# Patient Record
Sex: FEMALE | Race: WHITE | NOT HISPANIC OR LATINO | Employment: UNEMPLOYED | URBAN - METROPOLITAN AREA
[De-identification: names, ages, dates, MRNs, and addresses within clinical notes are randomized per-mention and may not be internally consistent; named-entity substitution may affect disease eponyms.]

---

## 2020-03-24 ENCOUNTER — PROCEDURE VISIT (OUTPATIENT)
Dept: OBSTETRICS AND GYNECOLOGY | Facility: CLINIC | Age: 30
End: 2020-03-24

## 2020-03-24 ENCOUNTER — INITIAL PRENATAL (OUTPATIENT)
Dept: OBSTETRICS AND GYNECOLOGY | Facility: CLINIC | Age: 30
End: 2020-03-24

## 2020-03-24 VITALS
HEIGHT: 62 IN | WEIGHT: 113 LBS | SYSTOLIC BLOOD PRESSURE: 100 MMHG | BODY MASS INDEX: 20.8 KG/M2 | DIASTOLIC BLOOD PRESSURE: 70 MMHG

## 2020-03-24 DIAGNOSIS — Z71.6 ENCOUNTER FOR SMOKING CESSATION COUNSELING: ICD-10-CM

## 2020-03-24 DIAGNOSIS — Z34.91 INITIAL OBSTETRIC VISIT IN FIRST TRIMESTER: Primary | ICD-10-CM

## 2020-03-24 DIAGNOSIS — Z34.92 UNCERTAIN DATES, ANTEPARTUM, SECOND TRIMESTER: Primary | ICD-10-CM

## 2020-03-24 LAB
GLUCOSE UR STRIP-MCNC: NEGATIVE MG/DL
PROT UR STRIP-MCNC: ABNORMAL MG/DL

## 2020-03-24 PROCEDURE — 99204 OFFICE O/P NEW MOD 45 MIN: CPT | Performed by: OBSTETRICS & GYNECOLOGY

## 2020-03-24 PROCEDURE — 76815 OB US LIMITED FETUS(S): CPT | Performed by: OBSTETRICS & GYNECOLOGY

## 2020-03-24 RX ORDER — VITAMIN A, ASCORBIC ACID, CHOLECALCIFEROL, .ALPHA.-TOCOPHEROL ACETATE, DL-, THIAMINE MONONITRATE, RIBOFLAVIN, NIACINAMIDE, PYRIDOXINE HYDROCHLORIDE, FOLIC ACID, CYANOCOBALAMIN, CALCIUM CARBONATE, IRON, ZINC OXIDE, AND CUPRIC OXIDE 4000; 120; 400; 22; 1.84; 3; 20; 10; 1; 12; 200; 29; 25; 2 [IU]/1; MG/1; [IU]/1; [IU]/1; MG/1; MG/1; MG/1; MG/1; MG/1; UG/1; MG/1; MG/1; MG/1; MG/1
1 TABLET ORAL DAILY
Qty: 30 TABLET | Refills: 11 | Status: SHIPPED | OUTPATIENT
Start: 2020-03-24 | End: 2021-12-17

## 2020-03-24 NOTE — PROGRESS NOTES
CC: Initial obstetric visit    HPI: 29-year-old  6 para 2 presents at 15-3/7weeks by her certain last menstrual period and confirmed by today's ultrasound for her initial obstetric visit.  She is feeling well.  She is not experiencing vaginal bleeding or pelvic pain.  Her course was significant for 2 previous vaginal deliveries.  She does smoke approximately 4 cigarettes daily.    Review of systems    This is negative for nausea or vomiting.  It is negative for fever or chills.  It is negative for abdominal or pelvic pain.  It is negative for vaginal bleeding.  All other systems are reviewed and are negative    Assessment and plan    1.  Intrauterine pregnancy at 15-3/7 weeks  2.  Pregnancy related counseling was undertaken and questions were answered.  3.  Prenatal vitamins prescribed  4.  Counseled regarding genetic screening.  Questions answered.  The patient would like to proceed with free fetal DNA testing, cystic fibrosis carrier screening and AFP testing.  5.  Patient smokes approximately 4 cigarettes daily.  Counseled regarding methods to achieve gradual withdrawal from cigarettes.  The patient would like to try this.  5 minutes were spent in direct face-to-face counseling on this issue.  6.  Counseled regarding precautions related to COVID 19

## 2020-03-25 LAB
C TRACH RRNA CVX QL NAA+PROBE: NEGATIVE
CONV .: NORMAL
CYTOLOGIST CVX/VAG CYTO: NORMAL
CYTOLOGY CVX/VAG DOC CYTO: NORMAL
CYTOLOGY CVX/VAG DOC THIN PREP: NORMAL
DX ICD CODE: NORMAL
HIV 1 & 2 AB SER-IMP: NORMAL
N GONORRHOEA RRNA CVX QL NAA+PROBE: NEGATIVE
OTHER STN SPEC: NORMAL
STAT OF ADQ CVX/VAG CYTO-IMP: NORMAL

## 2020-03-26 DIAGNOSIS — O09.32 INITIAL OBSTETRIC VISIT IN SECOND TRIMESTER: Primary | ICD-10-CM

## 2020-03-26 LAB
ABO GROUP BLD: ABNORMAL
BACTERIA UR CULT: NO GROWTH
BACTERIA UR CULT: NORMAL
BASOPHILS # BLD AUTO: 0 X10E3/UL (ref 0–0.2)
BASOPHILS NFR BLD AUTO: 0 %
BLD GP AB SCN SERPL QL: NEGATIVE
EOSINOPHIL # BLD AUTO: 0 X10E3/UL (ref 0–0.4)
EOSINOPHIL NFR BLD AUTO: 0 %
ERYTHROCYTE [DISTWIDTH] IN BLOOD BY AUTOMATED COUNT: 13.1 % (ref 11.7–15.4)
HBV SURFACE AG SERPL QL IA: NEGATIVE
HCT VFR BLD AUTO: 33.5 % (ref 34–46.6)
HCV AB S/CO SERPL IA: >11 S/CO RATIO (ref 0–0.9)
HGB BLD-MCNC: 11.2 G/DL (ref 11.1–15.9)
HIV 1+2 AB+HIV1 P24 AG SERPL QL IA: NON REACTIVE
IMM GRANULOCYTES # BLD AUTO: 0.1 X10E3/UL (ref 0–0.1)
IMM GRANULOCYTES NFR BLD AUTO: 1 %
LYMPHOCYTES # BLD AUTO: 1.7 X10E3/UL (ref 0.7–3.1)
LYMPHOCYTES NFR BLD AUTO: 17 %
MCH RBC QN AUTO: 30.4 PG (ref 26.6–33)
MCHC RBC AUTO-ENTMCNC: 33.4 G/DL (ref 31.5–35.7)
MCV RBC AUTO: 91 FL (ref 79–97)
MONOCYTES # BLD AUTO: 0.5 X10E3/UL (ref 0.1–0.9)
MONOCYTES NFR BLD AUTO: 5 %
NEUTROPHILS # BLD AUTO: 7.7 X10E3/UL (ref 1.4–7)
NEUTROPHILS NFR BLD AUTO: 77 %
PLATELET # BLD AUTO: 281 X10E3/UL (ref 150–450)
RBC # BLD AUTO: 3.69 X10E6/UL (ref 3.77–5.28)
RH BLD: POSITIVE
RPR SER QL: NON REACTIVE
RUBV IGG SERPL IA-ACNC: 2.08 INDEX
WBC # BLD AUTO: 10 X10E3/UL (ref 3.4–10.8)

## 2020-03-27 ENCOUNTER — RESULTS ENCOUNTER (OUTPATIENT)
Dept: OBSTETRICS AND GYNECOLOGY | Facility: CLINIC | Age: 30
End: 2020-03-27

## 2020-03-27 DIAGNOSIS — O09.32 INITIAL OBSTETRIC VISIT IN SECOND TRIMESTER: ICD-10-CM

## 2020-03-27 LAB
AFP ADJ MOM SERPL: 1
AFP INTERP SERPL-IMP: NORMAL
AFP INTERP SERPL-IMP: NORMAL
AFP SERPL-MCNC: 37 NG/ML
AGE AT DELIVERY: 32.2 YR
GA METHOD: NORMAL
GA: 15.4 WEEKS
IDDM PATIENT QL: NO
LABORATORY COMMENT REPORT: NORMAL
MULTIPLE PREGNANCY: NO
NEURAL TUBE DEFECT RISK FETUS: NORMAL %
RESULT: NORMAL

## 2020-03-28 LAB
CFDNA.FET/CFDNA.TOTAL SFR FETUS: NORMAL %
CFDNA.FET/CFDNA.TOTAL SFR FETUS: NORMAL %
CITATION REF LAB TEST: NORMAL
FET 13+18+21+X+Y ANEUP PLAS.CFDNA: NEGATIVE
FET CHR 21 TS PLAS.CFDNA QL: NEGATIVE
FET SEX PLAS.CFDNA DOSAGE CFDNA: NORMAL
FET TS 13 RISK PLAS.CFDNA QL: NEGATIVE
FET TS 18 RISK WBC.DNA+CFDNA QL: NEGATIVE
GA EST FROM CONCEPTION DATE: NORMAL D
GESTATIONAL AGE > 9:: NORMAL
LAB DIRECTOR NAME PROVIDER: NORMAL
LABORATORY COMMENT REPORT: NORMAL
LIMITATIONS OF THE TEST: NORMAL
NEGATIVE PREDICTIVE VALUE: NORMAL
NOTE: NORMAL
PERFORMANCE CHARACTERISTICS: NORMAL
POSITIVE PREDICTIVE VALUE: NORMAL
REF LAB TEST METHOD: NORMAL
TEST PERFORMANCE INFO SPEC: NORMAL

## 2020-03-30 LAB
CFTR MUT ANL BLD/T: NORMAL
LABORATORY COMMENT REPORT: NORMAL

## 2020-04-09 ENCOUNTER — TELEPHONE (OUTPATIENT)
Dept: GASTROENTEROLOGY | Facility: CLINIC | Age: 30
End: 2020-04-09

## 2020-04-09 NOTE — TELEPHONE ENCOUNTER
Lm on pt vm office visit changed to [facility] TELEHEALTH visit due to covd restrictions pt to call to confirm [facility] not [facility] up

## 2020-04-21 ENCOUNTER — PROCEDURE VISIT (OUTPATIENT)
Dept: OBSTETRICS AND GYNECOLOGY | Facility: CLINIC | Age: 30
End: 2020-04-21

## 2020-04-21 ENCOUNTER — ROUTINE PRENATAL (OUTPATIENT)
Dept: OBSTETRICS AND GYNECOLOGY | Facility: CLINIC | Age: 30
End: 2020-04-21

## 2020-04-21 VITALS — WEIGHT: 120.8 LBS | SYSTOLIC BLOOD PRESSURE: 108 MMHG | BODY MASS INDEX: 22.09 KG/M2 | DIASTOLIC BLOOD PRESSURE: 70 MMHG

## 2020-04-21 DIAGNOSIS — Z36.89 ENCOUNTER FOR FETAL ANATOMIC SURVEY: Primary | ICD-10-CM

## 2020-04-21 DIAGNOSIS — O44.42 LOW-LYING PLACENTA IN SECOND TRIMESTER: ICD-10-CM

## 2020-04-21 DIAGNOSIS — O44.40 LOW-LYING PLACENTA: ICD-10-CM

## 2020-04-21 DIAGNOSIS — Z34.92 SECOND TRIMESTER PREGNANCY: Primary | ICD-10-CM

## 2020-04-21 LAB
GLUCOSE UR STRIP-MCNC: NEGATIVE MG/DL
PROT UR STRIP-MCNC: NEGATIVE MG/DL

## 2020-04-21 PROCEDURE — 76805 OB US >/= 14 WKS SNGL FETUS: CPT | Performed by: OBSTETRICS & GYNECOLOGY

## 2020-04-21 PROCEDURE — 99214 OFFICE O/P EST MOD 30 MIN: CPT | Performed by: OBSTETRICS & GYNECOLOGY
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
## 2020-04-23 LAB — HCV RNA SERPL QL NAA+PROBE: POSITIVE

## 2020-04-30 ENCOUNTER — TELEMEDICINE (OUTPATIENT)
Dept: GASTROENTEROLOGY | Facility: CLINIC | Age: 30
End: 2020-04-30

## 2020-04-30 VITALS — BODY MASS INDEX: 22.09 KG/M2 | HEIGHT: 62 IN

## 2020-04-30 DIAGNOSIS — Z34.92 PREGNANT AND NOT YET DELIVERED IN SECOND TRIMESTER: ICD-10-CM

## 2020-04-30 DIAGNOSIS — B18.2 CHRONIC HEPATITIS C WITHOUT HEPATIC COMA (HCC): Primary | ICD-10-CM

## 2020-04-30 DIAGNOSIS — F19.11 HISTORY OF DRUG ABUSE IN REMISSION (HCC): ICD-10-CM

## 2020-04-30 PROCEDURE — 99204 OFFICE O/P NEW MOD 45 MIN: CPT | Performed by: INTERNAL MEDICINE

## 2020-04-30 NOTE — PROGRESS NOTES
Chief Complaint   Patient presents with   • Hepatitis C       Subjective     HPI    Demi Ag is a 29 y.o. female with a past medical history noted below who presents for evaluation of hepatitis C.  She is currently pregnant and she was found to have a positive Hepatis C antibody.  She is followed by Dr Boateng     This was the first she heard of being hepatitis C positive.  She denies any prior history of liver disease.  No right upper quadrant pain.  No jaundice, no icterus.  Her hepatitis B antibody was negative, her HIV screening was negative.    She has not had a CMP done.  She has not had a liver ultrasound.  March 24 labs show a normal hemoglobin and normal platelets.    She resides and teaches classes at [employer]    She is currently a smoker.  No current ETOH use.    Younger brother with hepatitis C. No other family hx of liver dz.  No family hx of GI cancers.      Prior hx of homemade tattoos, piercings.  She has used drugs in the past, including IV and intranasal.  There is no current illicit drug use.    Due date is September 12.  She is set to see Dr. Rebolledo on the 19th.  She does have a low-lying placenta so is considered high risk with her pregnancy.    This was an audio and video enabled telemedicine encounter.          Past Medical History:   Diagnosis Date   • Bronchitis    • Infectious viral hepatitis C         Current Outpatient Medications:   •  Prenatal Vit-Iron Carbonyl-FA (PRENATAL PLUS IRON) 29-1 MG tablet, Take 1 tablet by mouth Daily., Disp: 30 tablet, Rfl: 11    No Known Allergies    Social History     Socioeconomic History   • Marital status: Single     Spouse name: Not on file   • Number of children: Not on file   • Years of education: Not on file   • Highest education level: Not on file   Tobacco Use   • Smoking status: Current Every Day Smoker   Substance and Sexual Activity   • Alcohol use: Not Currently   • Drug use: Not Currently   • Sexual activity: Yes      Partners: Male       Family History   Problem Relation Age of Onset   • Breast cancer Maternal Grandmother        Review of Systems   Constitutional: Negative for activity change, appetite change and fatigue.   HENT: Negative for sore throat and trouble swallowing.    Respiratory: Negative.    Cardiovascular: Negative.    Gastrointestinal: Negative for abdominal distention, abdominal pain and blood in stool.   Endocrine: Negative for cold intolerance and heat intolerance.   Genitourinary: Negative for difficulty urinating, dysuria and frequency.   Musculoskeletal: Negative for arthralgias, back pain and myalgias.   Skin: Negative.    Hematological: Negative for adenopathy. Does not bruise/bleed easily.   All other systems reviewed and are negative.      Objective     There were no vitals filed for this visit.  There were no vitals filed for this visit.  Body mass index is 22.09 kg/m².    Physical Exam   Constitutional: She is oriented to person, place, and time. She appears well-developed and well-nourished. No distress.   HENT:   Head: Normocephalic and atraumatic.   Right Ear: External ear normal.   Left Ear: External ear normal.   Nose: Nose normal.   Eyes: EOM are normal.   Pulmonary/Chest: Effort normal. No respiratory distress.   Neurological: She is alert and oriented to person, place, and time. Coordination normal.   Psychiatric: She has a normal mood and affect. Her behavior is normal. Judgment and thought content normal.       WBC   Date Value Ref Range Status   03/24/2020 10.0 3.4 - 10.8 x10E3/uL Final     RBC   Date Value Ref Range Status   03/24/2020 3.69 (L) 3.77 - 5.28 x10E6/uL Final     Hemoglobin   Date Value Ref Range Status   03/24/2020 11.2 11.1 - 15.9 g/dL Final     Hematocrit   Date Value Ref Range Status   03/24/2020 33.5 (L) 34.0 - 46.6 % Final     MCV   Date Value Ref Range Status   03/24/2020 91 79 - 97 fL Final     MCH   Date Value Ref Range Status   03/24/2020 30.4 26.6 - 33.0 pg Final      MCHC   Date Value Ref Range Status   03/24/2020 33.4 31.5 - 35.7 g/dL Final     RDW   Date Value Ref Range Status   03/24/2020 13.1 11.7 - 15.4 % Final     Platelets   Date Value Ref Range Status   03/24/2020 281 150 - 450 x10E3/uL Final     Neutrophil Rel %   Date Value Ref Range Status   03/24/2020 77 Not Estab. % Final     Lymphocyte Rel %   Date Value Ref Range Status   03/24/2020 17 Not Estab. % Final     Monocyte Rel %   Date Value Ref Range Status   03/24/2020 5 Not Estab. % Final     Eosinophil Rel %   Date Value Ref Range Status   03/24/2020 0 Not Estab. % Final     Basophil Rel %   Date Value Ref Range Status   03/24/2020 0 Not Estab. % Final     Neutrophils Absolute   Date Value Ref Range Status   03/24/2020 7.7 (H) 1.4 - 7.0 x10E3/uL Final     Lymphocytes Absolute   Date Value Ref Range Status   03/24/2020 1.7 0.7 - 3.1 x10E3/uL Final     Monocytes Absolute   Date Value Ref Range Status   03/24/2020 0.5 0.1 - 0.9 x10E3/uL Final     Eosinophils Absolute   Date Value Ref Range Status   03/24/2020 0.0 0.0 - 0.4 x10E3/uL Final     Basophils Absolute   Date Value Ref Range Status   03/24/2020 0.0 0.0 - 0.2 x10E3/uL Final       No results found for: GLUCOSE, NA, K, CO2, CL, ANIONGAP, CREATININE, BUN, BCR, CALCIUM, EGFRIFNONA, ALKPHOS, PROTEINTOT, ALT, AST, BILITOT, ALBUMIN, GLOB, LABIL2      Imaging Results (Last 7 Days)     ** No results found for the last 168 hours. **            No notes on file    Assessment/Plan    Hepatitis C: Suspected chronic, history of drug use in the past, none currently.  Lives at [facility].    5 months pregnant: With low-lying placenta, due date in September    History of drug abuse: No current drug use    Plan  Will check hepatitis C viral load, genotype  CMP to assess liver  Complete hep B testing  We discussed plans for treatment and need for delay after delivery and upon cessation of breast-feeding if she goes that route  Further recommendations after  testing  Will see if she can get labs when she sees Dr. Boateng May 19  We will plan to have her to the office in approximately October after she delivers  May need to consider liver ultrasound based on CMP results  Discussed treatment, duration, side effects, need for adherence during the treatment period, need for lab testing    23 minutes spent in discussion of plan as detailed above as well as discussing treatment options for hepatitis C along with the duration of treatment and need for lab testing during that time.    Demi was seen today for hepatitis c.    Diagnoses and all orders for this visit:    Chronic hepatitis C without hepatic coma (CMS/HCC)  -     Comprehensive Metabolic Panel; Future  -     Hepatitis C RNA, Quantitative, PCR (graph); Future  -     Hepatitis C Genotype; Future  -     Hepatitis B Surface Antibody; Future  -     HCV FibroSURE; Future  -     Hepatitis B Core Antibody, Total; Future    Pregnant and not yet delivered in second trimester  -     Comprehensive Metabolic Panel; Future  -     Hepatitis C RNA, Quantitative, PCR (graph); Future  -     Hepatitis C Genotype; Future  -     Hepatitis B Surface Antibody; Future  -     HCV FibroSURE; Future    History of drug abuse in remission (CMS/HCC)        I have discussed the above plan with the patient.  They verbalize understanding and are in agreement with the plan.  They have been advised to contact the office for any questions, concerns, or changes related to their health.    Dictated utilizing Dragon dictation

## 2020-05-05 ENCOUNTER — RESULTS ENCOUNTER (OUTPATIENT)
Dept: GASTROENTEROLOGY | Facility: CLINIC | Age: 30
End: 2020-05-05

## 2020-05-05 DIAGNOSIS — B18.2 CHRONIC HEPATITIS C WITHOUT HEPATIC COMA (HCC): ICD-10-CM

## 2020-05-07 ENCOUNTER — TELEPHONE (OUTPATIENT)
Dept: GASTROENTEROLOGY | Facility: CLINIC | Age: 30
End: 2020-05-07

## 2020-05-07 DIAGNOSIS — B18.2 CHRONIC HEPATITIS C WITHOUT HEPATIC COMA (HCC): ICD-10-CM

## 2020-05-07 DIAGNOSIS — Z34.92 PREGNANT AND NOT YET DELIVERED IN SECOND TRIMESTER: ICD-10-CM

## 2020-05-07 NOTE — TELEPHONE ENCOUNTER
"Alessia, Suzanna Evans RN Hi Melissa,      I checked with our [facility] lady and she said she would be happy to draw those labs here. However, the order has to be \"Active\" --not a future order. If you will make that change there should be no problem.     Thank you,      **Orders changed to \"active\".  Labs obtained on 5/19 should include all marked \"active\" today, and the lab of 5/2, which is \"active.  (total of  6 labs).  Message to Georgiana Barillas.  Suzanna Palacios RN.   "

## 2020-05-07 NOTE — TELEPHONE ENCOUNTER
"Overdue alert received for hep b core antibody, hcv fibrosure, hep b surface antigen, hep c genotype, hep c rna, cmp.      See note of 4/30 - \"will see if can get labs when  sees Dr Boateng 5/19.\".     Call to Dr Boateng's office @ [phone] and spoke with Phoebe.  Will check if their lab has appropriate tubes and call back.    "

## 2020-05-19 ENCOUNTER — ROUTINE PRENATAL (OUTPATIENT)
Dept: OBSTETRICS AND GYNECOLOGY | Facility: CLINIC | Age: 30
End: 2020-05-19

## 2020-05-19 ENCOUNTER — PROCEDURE VISIT (OUTPATIENT)
Dept: OBSTETRICS AND GYNECOLOGY | Facility: CLINIC | Age: 30
End: 2020-05-19

## 2020-05-19 VITALS — BODY MASS INDEX: 23.08 KG/M2 | SYSTOLIC BLOOD PRESSURE: 100 MMHG | DIASTOLIC BLOOD PRESSURE: 62 MMHG | WEIGHT: 126.2 LBS

## 2020-05-19 DIAGNOSIS — Z36.89 ENCOUNTER FOR ULTRASOUND TO CHECK FETAL GROWTH: ICD-10-CM

## 2020-05-19 DIAGNOSIS — O44.40 ULTRASOUND SCAN TO RECHECK LOW LYING PLACENTA, ANTEPARTUM: ICD-10-CM

## 2020-05-19 DIAGNOSIS — O44.40 LOW-LYING PLACENTA: ICD-10-CM

## 2020-05-19 DIAGNOSIS — Z3A.23 23 WEEKS GESTATION OF PREGNANCY: Primary | ICD-10-CM

## 2020-05-19 DIAGNOSIS — O44.40 LOW-LYING PLACENTA: Primary | ICD-10-CM

## 2020-05-19 LAB
GLUCOSE UR STRIP-MCNC: NEGATIVE MG/DL
PROT UR STRIP-MCNC: NEGATIVE MG/DL

## 2020-05-19 PROCEDURE — 76816 OB US FOLLOW-UP PER FETUS: CPT | Performed by: OBSTETRICS & GYNECOLOGY

## 2020-05-19 PROCEDURE — 99214 OFFICE O/P EST MOD 30 MIN: CPT | Performed by: OBSTETRICS & GYNECOLOGY

## 2020-05-19 NOTE — PROGRESS NOTES
CC: Obstetric visit    HPI: 29-year-old  6 para 2 presents at 23-3/7 weeks for her scheduled obstetric visit.  Her baby is moving actively.  She is feeling well.  She is doing her 1 hour glucose tolerance test today.  The patient reports that she visited with ROWAN Akhtar, regarding hepatitis C.  The notes from this visit are not available for my review.  The patient reports that additional testing was ordered, but no additional treatment will be required during pregnancy.  Also, she has a history of a low-lying placenta.  Ultrasound was repeated today.    Review of systems    This is positive for occasional low back pain.  It is negative for nausea or vomiting.  Negative for fever or chills.  Negative for abdominal or pelvic pain.  Negative for vaginal bleeding.  All other systems are reviewed and are negative.    Physical examination    The abdomen is soft and gravid.  There is no epigastric tenderness.  No fundal tenderness.  No CVA tenderness.  No suprapubic tenderness.  Extremities are equal in size with minimal pedal edema    Assessment and plan    1.  Intrauterine pregnancy at 23-3/7 weeks  2.  Hepatitis C.  The patient reports a GI evaluation.  Further testing was recommended, but no interventions during pregnancy.  We will attempt to obtain the GI notes.  3.  Ultrasound was reviewed and discussed with the patient.  The placenta remains low-lying.  I recommend continued pelvic rest for now.  We can repeat the ultrasound and approximately 4 to 5 weeks.  The patient agrees with this recommendation.

## 2020-05-20 LAB
ALBUMIN SERPL-MCNC: 3.9 G/DL (ref 3.5–5.2)
ALBUMIN/GLOB SERPL: 1.6 G/DL
ALP SERPL-CCNC: 48 U/L (ref 39–117)
ALT SERPL-CCNC: 13 U/L (ref 1–33)
AST SERPL-CCNC: 11 U/L (ref 1–32)
BASOPHILS # BLD AUTO: 0.04 10*3/MM3 (ref 0–0.2)
BASOPHILS NFR BLD AUTO: 0.4 % (ref 0–1.5)
BILIRUB SERPL-MCNC: 0.3 MG/DL (ref 0.2–1.2)
BLD GP AB SCN SERPL QL: NEGATIVE
BUN SERPL-MCNC: 8 MG/DL (ref 6–20)
BUN/CREAT SERPL: 15.7 (ref 7–25)
CALCIUM SERPL-MCNC: 9 MG/DL (ref 8.6–10.5)
CHLORIDE SERPL-SCNC: 100 MMOL/L (ref 98–107)
CO2 SERPL-SCNC: 22.8 MMOL/L (ref 22–29)
CREAT SERPL-MCNC: 0.51 MG/DL (ref 0.57–1)
EOSINOPHIL # BLD AUTO: 0.01 10*3/MM3 (ref 0–0.4)
EOSINOPHIL NFR BLD AUTO: 0.1 % (ref 0.3–6.2)
ERYTHROCYTE [DISTWIDTH] IN BLOOD BY AUTOMATED COUNT: 12.5 % (ref 12.3–15.4)
GLOBULIN SER CALC-MCNC: 2.4 GM/DL
GLUCOSE 1H P 50 G GLC PO SERPL-MCNC: 113 MG/DL (ref 65–179)
GLUCOSE SERPL-MCNC: 110 MG/DL (ref 65–99)
HBV CORE AB SERPL QL IA: NEGATIVE
HBV SURFACE AB SER QL: REACTIVE
HCT VFR BLD AUTO: 27.5 % (ref 34–46.6)
HGB BLD-MCNC: 9.5 G/DL (ref 12–15.9)
IMM GRANULOCYTES # BLD AUTO: 0.07 10*3/MM3 (ref 0–0.05)
IMM GRANULOCYTES NFR BLD AUTO: 0.7 % (ref 0–0.5)
LYMPHOCYTES # BLD AUTO: 1.53 10*3/MM3 (ref 0.7–3.1)
LYMPHOCYTES NFR BLD AUTO: 14.8 % (ref 19.6–45.3)
MCH RBC QN AUTO: 31.6 PG (ref 26.6–33)
MCHC RBC AUTO-ENTMCNC: 34.5 G/DL (ref 31.5–35.7)
MCV RBC AUTO: 91.4 FL (ref 79–97)
MONOCYTES # BLD AUTO: 0.36 10*3/MM3 (ref 0.1–0.9)
MONOCYTES NFR BLD AUTO: 3.5 % (ref 5–12)
NEUTROPHILS # BLD AUTO: 8.3 10*3/MM3 (ref 1.7–7)
NEUTROPHILS NFR BLD AUTO: 80.5 % (ref 42.7–76)
NRBC BLD AUTO-RTO: 0 /100 WBC (ref 0–0.2)
PLATELET # BLD AUTO: 269 10*3/MM3 (ref 140–450)
POTASSIUM SERPL-SCNC: 3.7 MMOL/L (ref 3.5–5.2)
PROT SERPL-MCNC: 6.3 G/DL (ref 6–8.5)
RBC # BLD AUTO: 3.01 10*6/MM3 (ref 3.77–5.28)
SODIUM SERPL-SCNC: 135 MMOL/L (ref 136–145)
WBC # BLD AUTO: 10.31 10*3/MM3 (ref 3.4–10.8)

## 2020-05-20 RX ORDER — FERROUS SULFATE 325(65) MG
325 TABLET ORAL 2 TIMES DAILY WITH MEALS
Qty: 60 TABLET | Refills: 6 | Status: SHIPPED | OUTPATIENT
Start: 2020-05-20 | End: 2021-12-17

## 2020-05-21 ENCOUNTER — RESULTS ENCOUNTER (OUTPATIENT)
Dept: OBSTETRICS AND GYNECOLOGY | Facility: CLINIC | Age: 30
End: 2020-05-21

## 2020-05-21 DIAGNOSIS — Z34.92 SECOND TRIMESTER PREGNANCY: ICD-10-CM

## 2020-05-21 LAB
A2 MACROGLOB SERPL-MCNC: 252 MG/DL (ref 110–276)
ALT SERPL W P-5'-P-CCNC: 14 IU/L (ref 0–40)
APO A-I SERPL-MCNC: 140 MG/DL (ref 116–209)
BILIRUB SERPL-MCNC: 0.2 MG/DL (ref 0–1.2)
FIBROSIS SCORING:: NORMAL
FIBROSIS STAGE SERPL QL: NORMAL
GGT SERPL-CCNC: 6 IU/L (ref 0–60)
HAPTOGLOB SERPL-MCNC: 64 MG/DL (ref 33–278)
HCV AB SER QL: NORMAL
LABORATORY COMMENT REPORT: NORMAL
LIVER FIBR SCORE SERPL CALC.FIBROSURE: 0.07 (ref 0–0.21)
NECROINFLAMM ACTIVITY SCORING:: NORMAL
NECROINFLAMMATORY ACT GRADE SERPL QL: NORMAL
NECROINFLAMMATORY ACT SCORE SERPL: 0.03 (ref 0–0.17)
SERVICE CMNT-IMP: NORMAL

## 2020-05-28 LAB
HCV GENTYP SERPL NAA+PROBE: NORMAL
HCV RNA SERPL NAA+PROBE-ACNC: NORMAL IU/ML
HCV RNA SERPL NAA+PROBE-LOG IU: 5.41 LOG10 IU/ML
LABORATORY COMMENT REPORT: NORMAL
TEST INFORMATION: NORMAL

## 2020-06-02 ENCOUNTER — TELEPHONE (OUTPATIENT)
Dept: GASTROENTEROLOGY | Facility: CLINIC | Age: 30
End: 2020-06-02

## 2020-06-03 ENCOUNTER — DOCUMENTATION (OUTPATIENT)
Dept: FAMILY MEDICINE CLINIC | Facility: TELEHEALTH | Age: 30
End: 2020-06-03

## 2020-06-03 ENCOUNTER — TELEPHONE (OUTPATIENT)
Dept: OBSTETRICS AND GYNECOLOGY | Facility: CLINIC | Age: 30
End: 2020-06-03

## 2020-06-03 NOTE — PROGRESS NOTES
[place] pain worsening each day--feels like needles and pins, lots of pressure on legs and knees; no open areas, blisters, or draining; unable to sleep because of severity of pain;     Spoke with her via phone, advised to go to nearest emergency department for evaluation and treatment; she did verbalize understanding and agreed with this plan of care    JERRY Watts  13:49  06/03/20
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
## 2020-06-16 ENCOUNTER — ROUTINE PRENATAL (OUTPATIENT)
Dept: OBSTETRICS AND GYNECOLOGY | Facility: CLINIC | Age: 30
End: 2020-06-16

## 2020-06-16 VITALS — SYSTOLIC BLOOD PRESSURE: 116 MMHG | WEIGHT: 129.4 LBS | DIASTOLIC BLOOD PRESSURE: 62 MMHG | BODY MASS INDEX: 23.67 KG/M2

## 2020-06-16 DIAGNOSIS — Z3A.27 27 WEEKS GESTATION OF PREGNANCY: Primary | ICD-10-CM

## 2020-06-16 PROCEDURE — 99213 OFFICE O/P EST LOW 20 MIN: CPT | Performed by: OBSTETRICS & GYNECOLOGY

## 2020-06-16 NOTE — PROGRESS NOTES
CC: Obstetric visit    HPI: 29-year-old  6 para 2 presents at 27-3/7 weeks for her scheduled obstetric visit.  Her baby is moving actively.  She does not have any vaginal bleeding or severe abdominal or pelvic pain.    Review of systems    This is negative for vaginal bleeding.  It is negative for severe abdominal or pelvic pain.  It is negative for nausea or vomiting.  It is negative for fever or chills.    Physical examination    The abdomen is soft and gravid.  There is no epigastric tenderness.  No fundal tenderness.  No suprapubic tenderness.  Extremities are equal in size with minimal pedal edema    Assessment and plan    1.  Intrauterine pregnancy at 27-3/7 weeks  2.  The patient has passed her 1 hour glucose tolerance test  3.  Low-lying placenta.  Counseled and questions answered.  I recommend an ultrasound soon to assess for resolution of the patient's low-lying placenta.  The patient would like to do this today if possible.

## 2020-06-18 ENCOUNTER — PROCEDURE VISIT (OUTPATIENT)
Dept: OBSTETRICS AND GYNECOLOGY | Facility: CLINIC | Age: 30
End: 2020-06-18

## 2020-06-18 DIAGNOSIS — O44.40 ULTRASOUND SCAN TO RECHECK LOW LYING PLACENTA, ANTEPARTUM: ICD-10-CM

## 2020-06-18 DIAGNOSIS — Z36.89 ENCOUNTER FOR ULTRASOUND TO CHECK FETAL GROWTH: Primary | ICD-10-CM

## 2020-06-18 PROCEDURE — 76816 OB US FOLLOW-UP PER FETUS: CPT | Performed by: OBSTETRICS & GYNECOLOGY

## 2020-06-26 ENCOUNTER — DOCUMENTATION (OUTPATIENT)
Dept: OBSTETRICS AND GYNECOLOGY | Facility: CLINIC | Age: 30
End: 2020-06-26

## 2020-07-07 ENCOUNTER — ROUTINE PRENATAL (OUTPATIENT)
Dept: OBSTETRICS AND GYNECOLOGY | Facility: CLINIC | Age: 30
End: 2020-07-07

## 2020-07-07 VITALS — WEIGHT: 125.6 LBS | BODY MASS INDEX: 22.97 KG/M2 | DIASTOLIC BLOOD PRESSURE: 62 MMHG | SYSTOLIC BLOOD PRESSURE: 108 MMHG

## 2020-07-07 DIAGNOSIS — Z3A.30 30 WEEKS GESTATION OF PREGNANCY: Primary | ICD-10-CM

## 2020-07-07 LAB
GLUCOSE UR STRIP-MCNC: NEGATIVE MG/DL
PROT UR STRIP-MCNC: NEGATIVE MG/DL

## 2020-07-07 PROCEDURE — 99212 OFFICE O/P EST SF 10 MIN: CPT | Performed by: OBSTETRICS & GYNECOLOGY

## 2020-07-07 NOTE — PROGRESS NOTES
CC: Obstetric visit    HPI: 29-year-old  6 para 2 presents at 30-3/7 weeks for her obstetric visit.  She is feeling well.  Her baby is moving actively.  She does not have vaginal bleeding or pelvic pain.  Her ultrasound last month was reviewed and discussed with her.  The placenta is now 3.4 cm from the cervical os.  The patient has resumed sexual activity without problems.    Review of systems    This is negative for abdominal or pelvic pain.  It is negative for nausea or vomiting.  It is negative for vaginal bleeding.  All other systems are reviewed and are negative.    Physical examination    The abdomen is soft and gravid.  There is no fundal tenderness.  No CVA tenderness.  No suprapubic tenderness.  Extremities are equal in size with minimal pedal edema    Assessment and plan    1.  Intrauterine pregnancy at 30-3/7 weeks  2.  History of low-lying placenta.  The placenta is now 3.4 cm from the cervical os.

## 2020-07-21 ENCOUNTER — ROUTINE PRENATAL (OUTPATIENT)
Dept: OBSTETRICS AND GYNECOLOGY | Facility: CLINIC | Age: 30
End: 2020-07-21

## 2020-07-21 VITALS — SYSTOLIC BLOOD PRESSURE: 104 MMHG | WEIGHT: 131.6 LBS | DIASTOLIC BLOOD PRESSURE: 60 MMHG | BODY MASS INDEX: 24.07 KG/M2

## 2020-07-21 DIAGNOSIS — Z3A.32 32 WEEKS GESTATION OF PREGNANCY: Primary | ICD-10-CM

## 2020-07-21 LAB
GLUCOSE UR STRIP-MCNC: NEGATIVE MG/DL
PROT UR STRIP-MCNC: NEGATIVE MG/DL

## 2020-07-21 PROCEDURE — 99212 OFFICE O/P EST SF 10 MIN: CPT | Performed by: OBSTETRICS & GYNECOLOGY

## 2020-07-21 NOTE — PROGRESS NOTES
CC: Obstetric visit    HPI: 29-year-old  6 para 2 presents at 32-3/7 weeks for scheduled obstetric visit.  Her baby is moving actively.  She has no complaints today.    Review of systems    This is negative for abdominal or pelvic pain.  Negative for vaginal bleeding.  Negative for fever or chills.  Negative for nausea or vomiting.    Physical examination    The abdomen is soft and gravid.  There is no fundal tenderness to palpation.  There is no epigastric tenderness.  There is no suprapubic tenderness.  Extremities are equal in size with minimal pedal edema    Assessment and plan    1.  Intrauterine pregnancy at 32-3/7 weeks  2.  The patient reports no more nosebleeds.  She has been using Ocean Spray nasal spray.

## 2020-08-04 ENCOUNTER — ROUTINE PRENATAL (OUTPATIENT)
Dept: OBSTETRICS AND GYNECOLOGY | Facility: CLINIC | Age: 30
End: 2020-08-04

## 2020-08-04 VITALS — BODY MASS INDEX: 24.14 KG/M2 | WEIGHT: 132 LBS | DIASTOLIC BLOOD PRESSURE: 62 MMHG | SYSTOLIC BLOOD PRESSURE: 116 MMHG

## 2020-08-04 DIAGNOSIS — Z3A.34 34 WEEKS GESTATION OF PREGNANCY: Primary | ICD-10-CM

## 2020-08-04 PROCEDURE — 99213 OFFICE O/P EST LOW 20 MIN: CPT | Performed by: OBSTETRICS & GYNECOLOGY

## 2020-08-04 NOTE — PROGRESS NOTES
CC: Obstetric visit    HPI: 29-year-old  6 para 2 presents at 34-3/7 weeks for her scheduled obstetric visit.  Her baby is moving actively.  She has pelvic pressure, but no contractions.    Review of systems    This is negative for abdominal or pelvic pain.  It is positive for pelvic pressure and low back pain.  It is negative for contractions.  Negative for vaginal bleeding.  All other systems are reviewed and are negative    Physical examination    The abdomen is soft and gravid.  There is no epigastric tenderness.  No fundal tenderness.  No CVA tenderness.  No suprapubic tenderness.  Extremities are equal in size with minimal bipedal edema    Assessment and plan    1.  Intrauterine pregnancy at 34-3/7 weeks  2.  Group B strep cultures at the next visit.  Counseled.  3.  Ultrasound at the next visit regarding fundal height greater than dates.  Counseled and questions answered.

## 2020-08-18 ENCOUNTER — ROUTINE PRENATAL (OUTPATIENT)
Dept: OBSTETRICS AND GYNECOLOGY | Facility: CLINIC | Age: 30
End: 2020-08-18

## 2020-08-18 ENCOUNTER — PROCEDURE VISIT (OUTPATIENT)
Dept: OBSTETRICS AND GYNECOLOGY | Facility: CLINIC | Age: 30
End: 2020-08-18

## 2020-08-18 VITALS — BODY MASS INDEX: 25.28 KG/M2 | DIASTOLIC BLOOD PRESSURE: 64 MMHG | WEIGHT: 138.2 LBS | SYSTOLIC BLOOD PRESSURE: 114 MMHG

## 2020-08-18 DIAGNOSIS — O26.849 UTERINE SIZE DATE DISCREPANCY PREGNANCY: ICD-10-CM

## 2020-08-18 DIAGNOSIS — Z36.89 ENCOUNTER FOR ULTRASOUND TO CHECK FETAL GROWTH: Primary | ICD-10-CM

## 2020-08-18 DIAGNOSIS — Z3A.36 36 WEEKS GESTATION OF PREGNANCY: Primary | ICD-10-CM

## 2020-08-18 LAB
GLUCOSE UR STRIP-MCNC: NEGATIVE MG/DL
PROT UR STRIP-MCNC: NEGATIVE MG/DL

## 2020-08-18 PROCEDURE — 99213 OFFICE O/P EST LOW 20 MIN: CPT | Performed by: OBSTETRICS & GYNECOLOGY

## 2020-08-18 PROCEDURE — 76816 OB US FOLLOW-UP PER FETUS: CPT | Performed by: OBSTETRICS & GYNECOLOGY

## 2020-08-18 NOTE — PROGRESS NOTES
CC: Obstetric visit    HPI: 29-year-old  6 para 2 presents at 36-3/7 weeks for her scheduled obstetric visit.  Her baby is moving actively.  She has only rare contractions.  No complaints today.    Review of systems    This is negative for abdominal or pelvic pain.  Negative for vaginal bleeding.  Negative for nausea or vomiting.  Negative for fever or chills.  All other systems are reviewed and are negative    Physical examination    The abdomen is soft and gravid.  There is no epigastric tenderness.  No fundal tenderness.  No CVA tenderness.  Pelvic examination reveals normal female external genitalia  There is no blood in the vaginal vault  The cervix is 50% effaced and 0.5 cm dilated  Extremities are equal in size with 1+ bipedal edema    Assessment and plan    1.  Intrauterine pregnancy at 36-3/7 weeks  2.  Group B strep cultures performed.  Counseled regarding the significance of this and questions answered.  3.  Ultrasound was reviewed and discussed with the patient.  This shows an estimated fetal weight in essentially the 24th percentile.  Growth is symmetric.  Amniotic fluid index is normal.  4.  Labor warnings and rupture membrane warnings given.

## 2020-08-22 LAB — B-HEM STREP SPEC QL CULT: NEGATIVE

## 2020-08-24 ENCOUNTER — ROUTINE PRENATAL (OUTPATIENT)
Dept: OBSTETRICS AND GYNECOLOGY | Facility: CLINIC | Age: 30
End: 2020-08-24

## 2020-08-24 VITALS — WEIGHT: 135 LBS | SYSTOLIC BLOOD PRESSURE: 116 MMHG | BODY MASS INDEX: 24.69 KG/M2 | DIASTOLIC BLOOD PRESSURE: 64 MMHG

## 2020-08-24 DIAGNOSIS — Z3A.37 37 WEEKS GESTATION OF PREGNANCY: Primary | ICD-10-CM

## 2020-08-24 LAB
GLUCOSE UR STRIP-MCNC: NEGATIVE MG/DL
PROT UR STRIP-MCNC: NEGATIVE MG/DL

## 2020-08-24 PROCEDURE — 99213 OFFICE O/P EST LOW 20 MIN: CPT | Performed by: OBSTETRICS & GYNECOLOGY

## 2020-08-24 NOTE — PROGRESS NOTES
CC: Obstetric visit    HPI: 29-year-old  6 para 2 presents at 37-2/7 weeks for her scheduled obstetric visit.  She has no complaints today.  Her baby is moving actively.  She has occasional contractions.    Review of systems    This is negative for rhythmic contractions.  It is negative for vaginal bleeding.  It is negative for severe abdominal or pelvic pain.  All other systems are reviewed and are negative.    Physical examination    The abdomen is soft and gravid.  There is no epigastric tenderness.  No fundal tenderness.  No suprapubic tenderness.  Extremities are equal in size with minimal pedal edema  Pelvic examination reveals normal female external genitalia  There is no blood in the vaginal vault  The cervix is 50% effaced and 0.5 cm dilated    Assessment and plan    1.  Intrauterine pregnancy at 37-2/7 weeks  2.  Group B strep cultures are negative.  Counseled.  3.  Labor warnings and rupture membrane warnings given.

## 2020-09-01 ENCOUNTER — ROUTINE PRENATAL (OUTPATIENT)
Dept: OBSTETRICS AND GYNECOLOGY | Facility: CLINIC | Age: 30
End: 2020-09-01

## 2020-09-01 VITALS — SYSTOLIC BLOOD PRESSURE: 104 MMHG | BODY MASS INDEX: 24.84 KG/M2 | WEIGHT: 135.8 LBS | DIASTOLIC BLOOD PRESSURE: 62 MMHG

## 2020-09-01 DIAGNOSIS — Z3A.38 38 WEEKS GESTATION OF PREGNANCY: Primary | ICD-10-CM

## 2020-09-01 LAB
GLUCOSE UR STRIP-MCNC: NEGATIVE MG/DL
PROT UR STRIP-MCNC: NEGATIVE MG/DL

## 2020-09-01 PROCEDURE — 99213 OFFICE O/P EST LOW 20 MIN: CPT | Performed by: OBSTETRICS & GYNECOLOGY

## 2020-09-01 NOTE — PROGRESS NOTES
CC: Obstetric visit    HPI: 29-year-old  6 para 2 presents at 38-3/7 weeks for her scheduled obstetric visit.  Her baby is moving actively.  She has occasional contractions.  Overall, she is feeling well.    Review of systems    This is negative for regular contractions.  It is negative for vaginal bleeding.  It is negative for abdominal or pelvic pain.  Negative for fever or chills.  All other systems are reviewed and are negative    The abdomen is soft and gravid.  There is no fundal tenderness.  No epigastric tenderness.  No CVA tenderness.  No suprapubic tenderness.  Extremities are equal in size with minimal pedal edema    Assessment and plan    1.  Intrauterine pregnancy at 38-3/7 weeks  2.  Labor warnings and rupture membrane warnings given.  3.  The patient would like to use Depo-Provera for contraception.  We discussed the benefits, risks and alternatives to this.  The patient would like to proceed.  She prefers to receive her first injection while in the hospital after delivery.

## 2020-09-08 ENCOUNTER — ROUTINE PRENATAL (OUTPATIENT)
Dept: OBSTETRICS AND GYNECOLOGY | Facility: CLINIC | Age: 30
End: 2020-09-08

## 2020-09-08 VITALS — SYSTOLIC BLOOD PRESSURE: 118 MMHG | WEIGHT: 137 LBS | DIASTOLIC BLOOD PRESSURE: 72 MMHG | BODY MASS INDEX: 25.06 KG/M2

## 2020-09-08 DIAGNOSIS — B18.2 CHRONIC HEPATITIS C COMPLICATING PREGNANCY, ANTEPARTUM (HCC): ICD-10-CM

## 2020-09-08 DIAGNOSIS — Z3A.39 39 WEEKS GESTATION OF PREGNANCY: Primary | ICD-10-CM

## 2020-09-08 DIAGNOSIS — O98.419 CHRONIC HEPATITIS C COMPLICATING PREGNANCY, ANTEPARTUM (HCC): ICD-10-CM

## 2020-09-08 DIAGNOSIS — N89.8 VAGINAL IRRITATION: ICD-10-CM

## 2020-09-08 PROBLEM — Z34.93 PREGNANT AND NOT YET DELIVERED IN THIRD TRIMESTER: Status: ACTIVE | Noted: 2020-04-30

## 2020-09-08 LAB
GLUCOSE UR STRIP-MCNC: NEGATIVE MG/DL
PROT UR STRIP-MCNC: ABNORMAL MG/DL

## 2020-09-08 PROCEDURE — 99213 OFFICE O/P EST LOW 20 MIN: CPT | Performed by: STUDENT IN AN ORGANIZED HEALTH CARE EDUCATION/TRAINING PROGRAM

## 2020-09-08 NOTE — PROGRESS NOTES
Chief Complaint   Patient presents with   • Routine Prenatal Visit      Demi Ag is a 30 y.o.  at 39w3d who presents for routine prenatal visit. She reports active fetal movement. She is having occasional contractions that are not painful. She denies leakage of fluid, vaginal bleeding. She complains today of vaginal irritation that she notes with intercourse. Denies vaginal discharge.     ROS: headache, vision changes, chest pain, shortness of breath, dysuria, lower extremity swelling.     /72   Wt 62.1 kg (137 lb)   LMP 2019   BMI 25.06 kg/m²    Gen: well appearing, NAD  Abd: gravid, nontender  SVE: /-3  See OB Flowsheet    ASSESSMENT:   IUP at 39w3d   Chronic hepatitis C    PLAN:  Problem list updated.  Prenatal labs reviewed.   NuSwab collected today for vaginal irritation and will notify patient of abnormal results.   Third trimester precautions reviewed including labor signs, monitoring fetal movements.   Patient is planning on depo provera for contraception.     Follow up in 1 week with Dr. Boateng for routine prenatal visit.     Patient Active Problem List    Diagnosis Date Noted   • Chronic hepatitis C without hepatic coma (CMS/HCC) 2020   • Pregnant and not yet delivered in third trimester 2020   • History of drug abuse in remission (CMS/HCC) 2020       Orders Placed This Encounter   Procedures   • NuSwab Vaginitis (VG) - Swab, Vagina   • POC Urinalysis Dipstick     This is an external result entered through the Results Console.     I spent at least 10 minutes of 15 minute visit in face-to-face counseling as discussed above.    Denae Mckinley MD  2020  16:43

## 2020-09-10 LAB
A VAGINAE DNA VAG QL NAA+PROBE: ABNORMAL SCORE
BVAB2 DNA VAG QL NAA+PROBE: ABNORMAL SCORE
C ALBICANS DNA VAG QL NAA+PROBE: POSITIVE
C GLABRATA DNA VAG QL NAA+PROBE: NEGATIVE
MEGA1 DNA VAG QL NAA+PROBE: ABNORMAL SCORE
T VAGINALIS DNA VAG QL NAA+PROBE: NEGATIVE

## 2020-09-15 ENCOUNTER — ROUTINE PRENATAL (OUTPATIENT)
Dept: OBSTETRICS AND GYNECOLOGY | Facility: CLINIC | Age: 30
End: 2020-09-15

## 2020-09-15 ENCOUNTER — TELEPHONE (OUTPATIENT)
Dept: OBSTETRICS AND GYNECOLOGY | Facility: CLINIC | Age: 30
End: 2020-09-15

## 2020-09-15 ENCOUNTER — PREP FOR SURGERY (OUTPATIENT)
Dept: OTHER | Facility: HOSPITAL | Age: 30
End: 2020-09-15

## 2020-09-15 VITALS — DIASTOLIC BLOOD PRESSURE: 70 MMHG | BODY MASS INDEX: 24.62 KG/M2 | SYSTOLIC BLOOD PRESSURE: 118 MMHG | WEIGHT: 134.6 LBS

## 2020-09-15 DIAGNOSIS — O48.0 POST-TERM PREGNANCY, 40-42 WEEKS OF GESTATION: Primary | ICD-10-CM

## 2020-09-15 DIAGNOSIS — Z3A.40 40 WEEKS GESTATION OF PREGNANCY: Primary | ICD-10-CM

## 2020-09-15 PROCEDURE — 99212 OFFICE O/P EST SF 10 MIN: CPT | Performed by: OBSTETRICS & GYNECOLOGY

## 2020-09-15 RX ORDER — OXYTOCIN-SODIUM CHLORIDE 0.9% IV SOLN 30 UNIT/500ML 30-0.9/5 UT/ML-%
999 SOLUTION INTRAVENOUS ONCE
Status: CANCELLED | OUTPATIENT
Start: 2020-09-15 | End: 2020-09-15

## 2020-09-15 RX ORDER — METHYLERGONOVINE MALEATE 0.2 MG/ML
200 INJECTION INTRAVENOUS ONCE AS NEEDED
Status: CANCELLED | OUTPATIENT
Start: 2020-09-15

## 2020-09-15 RX ORDER — OXYTOCIN-SODIUM CHLORIDE 0.9% IV SOLN 30 UNIT/500ML 30-0.9/5 UT/ML-%
2-30 SOLUTION INTRAVENOUS
Status: CANCELLED | OUTPATIENT
Start: 2020-09-15

## 2020-09-15 RX ORDER — SODIUM CHLORIDE 0.9 % (FLUSH) 0.9 %
3 SYRINGE (ML) INJECTION EVERY 12 HOURS SCHEDULED
Status: CANCELLED | OUTPATIENT
Start: 2020-09-15

## 2020-09-15 RX ORDER — OXYTOCIN-SODIUM CHLORIDE 0.9% IV SOLN 30 UNIT/500ML 30-0.9/5 UT/ML-%
250 SOLUTION INTRAVENOUS CONTINUOUS
Status: CANCELLED | OUTPATIENT
Start: 2020-09-15 | End: 2020-09-15

## 2020-09-15 RX ORDER — SODIUM CHLORIDE 0.9 % (FLUSH) 0.9 %
10 SYRINGE (ML) INJECTION AS NEEDED
Status: CANCELLED | OUTPATIENT
Start: 2020-09-15

## 2020-09-15 RX ORDER — LIDOCAINE HYDROCHLORIDE 10 MG/ML
5 INJECTION, SOLUTION EPIDURAL; INFILTRATION; INTRACAUDAL; PERINEURAL AS NEEDED
Status: CANCELLED | OUTPATIENT
Start: 2020-09-15

## 2020-09-15 RX ORDER — MAGNESIUM CARB/ALUMINUM HYDROX 105-160MG
30 TABLET,CHEWABLE ORAL ONCE
Status: CANCELLED | OUTPATIENT
Start: 2020-09-15 | End: 2020-09-15

## 2020-09-15 RX ORDER — CARBOPROST TROMETHAMINE 250 UG/ML
250 INJECTION, SOLUTION INTRAMUSCULAR AS NEEDED
Status: CANCELLED | OUTPATIENT
Start: 2020-09-15

## 2020-09-15 RX ORDER — OXYTOCIN-SODIUM CHLORIDE 0.9% IV SOLN 30 UNIT/500ML 30-0.9/5 UT/ML-%
125 SOLUTION INTRAVENOUS CONTINUOUS PRN
Status: CANCELLED | OUTPATIENT
Start: 2020-09-15

## 2020-09-15 RX ORDER — MISOPROSTOL 200 UG/1
800 TABLET ORAL AS NEEDED
Status: CANCELLED | OUTPATIENT
Start: 2020-09-15

## 2020-09-15 NOTE — TELEPHONE ENCOUNTER
Called patient regarding results of vaginal swab. Verified name and . Patient reports vaginal irritation is improving and she is not having intercourse now. We discussed that her swab demonstrated yeast infection and I advised OTC Monistat for 7 days. Patient indicated understanding and all questions and concerns answered.    Denae Mckinley MD  9/15/2020  17:30 EDT

## 2020-09-15 NOTE — PROGRESS NOTES
CC: Obstetric visit    HPI: 30-year-old  6 para 2 presents at 40-3/7 weeks for her scheduled obstetric visit.  Her baby is moving actively.  She has occasional contractions.    Review of systems    This is positive for contractions.  Negative for vaginal bleeding.  Negative for nausea or vomiting.  Negative for fever or chills.  All other systems are reviewed and are negative.    Physical examination    The abdomen is soft and gravid.  There is no fundal tenderness.  No epigastric tenderness.  No CVA tenderness.  No suprapubic tenderness.  Pelvic examination reveals normal female external genitalia  There is no blood in the vaginal vault  The cervix is 1 to 2 cm dilated and 50% effaced  The extremities are equal in size with minimal pedal edema    Assessment and plan    1.  Intrauterine pregnancy at 40-3/7 weeks  2.  Counseled regarding the benefits and risks of continued expectant management versus an induction of labor.  I answered the patient's questions.  She elected to proceed with an induction of labor due to postdates.  She understands the benefits, risks and alternatives to this approach.  She would like to proceed.  Induction has been scheduled.

## 2020-09-16 ENCOUNTER — ANESTHESIA EVENT (OUTPATIENT)
Dept: LABOR AND DELIVERY | Facility: HOSPITAL | Age: 30
End: 2020-09-16

## 2020-09-16 ENCOUNTER — ANESTHESIA (OUTPATIENT)
Dept: LABOR AND DELIVERY | Facility: HOSPITAL | Age: 30
End: 2020-09-16

## 2020-09-16 ENCOUNTER — HOSPITAL ENCOUNTER (INPATIENT)
Facility: HOSPITAL | Age: 30
LOS: 3 days | Discharge: HOME | End: 2020-09-19
Attending: OBSTETRICS & GYNECOLOGY | Admitting: OBSTETRICS & GYNECOLOGY

## 2020-09-16 ENCOUNTER — HOSPITAL ENCOUNTER (INPATIENT)
Dept: LABOR AND DELIVERY | Facility: HOSPITAL | Age: 30
Discharge: HOME | End: 2020-09-16

## 2020-09-16 DIAGNOSIS — Z98.891 S/P CESAREAN SECTION: Primary | ICD-10-CM

## 2020-09-16 DIAGNOSIS — O48.0 POST-TERM PREGNANCY, 40-42 WEEKS OF GESTATION: ICD-10-CM

## 2020-09-16 LAB
ABO GROUP BLD: NORMAL
AMPHET+METHAMPHET UR QL: NEGATIVE
BARBITURATES UR QL SCN: NEGATIVE
BASOPHILS # BLD AUTO: 0.03 10*3/MM3 (ref 0–0.2)
BASOPHILS NFR BLD AUTO: 0.4 % (ref 0–1.5)
BENZODIAZ UR QL SCN: NEGATIVE
BLD GP AB SCN SERPL QL: NEGATIVE
CANNABINOIDS SERPL QL: NEGATIVE
COCAINE UR QL: NEGATIVE
DEPRECATED RDW RBC AUTO: 41.2 FL (ref 37–54)
EOSINOPHIL # BLD AUTO: 0 10*3/MM3 (ref 0–0.4)
EOSINOPHIL NFR BLD AUTO: 0 % (ref 0.3–6.2)
ERYTHROCYTE [DISTWIDTH] IN BLOOD BY AUTOMATED COUNT: 13.4 % (ref 12.3–15.4)
HCT VFR BLD AUTO: 28.7 % (ref 34–46.6)
HGB BLD-MCNC: 9.7 G/DL (ref 12–15.9)
HIV1+2 AB SER QL: NORMAL
IMM GRANULOCYTES # BLD AUTO: 0.05 10*3/MM3 (ref 0–0.05)
IMM GRANULOCYTES NFR BLD AUTO: 0.6 % (ref 0–0.5)
LYMPHOCYTES # BLD AUTO: 1.5 10*3/MM3 (ref 0.7–3.1)
LYMPHOCYTES NFR BLD AUTO: 19 % (ref 19.6–45.3)
MCH RBC QN AUTO: 28.7 PG (ref 26.6–33)
MCHC RBC AUTO-ENTMCNC: 33.8 G/DL (ref 31.5–35.7)
MCV RBC AUTO: 84.9 FL (ref 79–97)
METHADONE UR QL SCN: NEGATIVE
MONOCYTES # BLD AUTO: 0.48 10*3/MM3 (ref 0.1–0.9)
MONOCYTES NFR BLD AUTO: 6.1 % (ref 5–12)
NEUTROPHILS NFR BLD AUTO: 5.82 10*3/MM3 (ref 1.7–7)
NEUTROPHILS NFR BLD AUTO: 73.9 % (ref 42.7–76)
NRBC BLD AUTO-RTO: 0 /100 WBC (ref 0–0.2)
OPIATES UR QL: NEGATIVE
OXYCODONE UR QL SCN: NEGATIVE
PLATELET # BLD AUTO: 218 10*3/MM3 (ref 140–450)
PMV BLD AUTO: 9.8 FL (ref 6–12)
RBC # BLD AUTO: 3.38 10*6/MM3 (ref 3.77–5.28)
RH BLD: POSITIVE
RPR SER QL: NORMAL
SARS-COV-2 RDRP RESP QL NAA+PROBE: NOT DETECTED
T&S EXPIRATION DATE: NORMAL
WBC # BLD AUTO: 7.88 10*3/MM3 (ref 3.4–10.8)

## 2020-09-16 PROCEDURE — 86592 SYPHILIS TEST NON-TREP QUAL: CPT | Performed by: OBSTETRICS & GYNECOLOGY

## 2020-09-16 PROCEDURE — 80307 DRUG TEST PRSMV CHEM ANLYZR: CPT | Performed by: OBSTETRICS & GYNECOLOGY

## 2020-09-16 PROCEDURE — G0432 EIA HIV-1/HIV-2 SCREEN: HCPCS | Performed by: OBSTETRICS & GYNECOLOGY

## 2020-09-16 PROCEDURE — 86901 BLOOD TYPING SEROLOGIC RH(D): CPT | Performed by: OBSTETRICS & GYNECOLOGY

## 2020-09-16 PROCEDURE — 25010000002 ROPIVACAINE PER 1 MG: Performed by: ANESTHESIOLOGY

## 2020-09-16 PROCEDURE — 87635 SARS-COV-2 COVID-19 AMP PRB: CPT | Performed by: OBSTETRICS & GYNECOLOGY

## 2020-09-16 PROCEDURE — S0260 H&P FOR SURGERY: HCPCS | Performed by: OBSTETRICS & GYNECOLOGY

## 2020-09-16 PROCEDURE — 86850 RBC ANTIBODY SCREEN: CPT | Performed by: OBSTETRICS & GYNECOLOGY

## 2020-09-16 PROCEDURE — C1755 CATHETER, INTRASPINAL: HCPCS | Performed by: ANESTHESIOLOGY

## 2020-09-16 PROCEDURE — 3E033VJ INTRODUCTION OF OTHER HORMONE INTO PERIPHERAL VEIN, PERCUTANEOUS APPROACH: ICD-10-PCS | Performed by: OBSTETRICS & GYNECOLOGY

## 2020-09-16 PROCEDURE — 85025 COMPLETE CBC W/AUTO DIFF WBC: CPT | Performed by: OBSTETRICS & GYNECOLOGY

## 2020-09-16 PROCEDURE — 86900 BLOOD TYPING SEROLOGIC ABO: CPT | Performed by: OBSTETRICS & GYNECOLOGY

## 2020-09-16 RX ORDER — EPHEDRINE SULFATE 50 MG/ML
10 INJECTION, SOLUTION INTRAVENOUS
Status: DISCONTINUED | OUTPATIENT
Start: 2020-09-16 | End: 2020-09-17 | Stop reason: HOSPADM

## 2020-09-16 RX ORDER — LIDOCAINE HYDROCHLORIDE 15 MG/ML
INJECTION, SOLUTION EPIDURAL; INFILTRATION; INTRACAUDAL; PERINEURAL AS NEEDED
Status: DISCONTINUED | OUTPATIENT
Start: 2020-09-16 | End: 2020-09-17 | Stop reason: SURG

## 2020-09-16 RX ORDER — ONDANSETRON 2 MG/ML
4 INJECTION INTRAMUSCULAR; INTRAVENOUS ONCE AS NEEDED
Status: COMPLETED | OUTPATIENT
Start: 2020-09-16 | End: 2020-09-17

## 2020-09-16 RX ORDER — MAGNESIUM CARB/ALUMINUM HYDROX 105-160MG
30 TABLET,CHEWABLE ORAL ONCE
Status: DISCONTINUED | OUTPATIENT
Start: 2020-09-16 | End: 2020-09-17 | Stop reason: HOSPADM

## 2020-09-16 RX ORDER — LIDOCAINE HYDROCHLORIDE 10 MG/ML
5 INJECTION, SOLUTION EPIDURAL; INFILTRATION; INTRACAUDAL; PERINEURAL AS NEEDED
Status: DISCONTINUED | OUTPATIENT
Start: 2020-09-16 | End: 2020-09-17 | Stop reason: HOSPADM

## 2020-09-16 RX ORDER — OXYTOCIN-SODIUM CHLORIDE 0.9% IV SOLN 30 UNIT/500ML 30-0.9/5 UT/ML-%
2-30 SOLUTION INTRAVENOUS
Status: DISCONTINUED | OUTPATIENT
Start: 2020-09-16 | End: 2020-09-17 | Stop reason: HOSPADM

## 2020-09-16 RX ORDER — FAMOTIDINE 10 MG/ML
20 INJECTION, SOLUTION INTRAVENOUS ONCE AS NEEDED
Status: COMPLETED | OUTPATIENT
Start: 2020-09-16 | End: 2020-09-17

## 2020-09-16 RX ORDER — CLOTRIMAZOLE AND BETAMETHASONE DIPROPIONATE 10; .64 MG/G; MG/G
CREAM TOPICAL
COMMUNITY
Start: 2020-08-21 | End: 2021-12-17

## 2020-09-16 RX ORDER — SODIUM CHLORIDE 0.9 % (FLUSH) 0.9 %
10 SYRINGE (ML) INJECTION AS NEEDED
Status: DISCONTINUED | OUTPATIENT
Start: 2020-09-16 | End: 2020-09-17 | Stop reason: HOSPADM

## 2020-09-16 RX ORDER — ACETAMINOPHEN 325 MG/1
650 TABLET ORAL
COMMUNITY
Start: 2020-06-03 | End: 2020-09-19 | Stop reason: HOSPADM

## 2020-09-16 RX ORDER — SODIUM CHLORIDE 0.9 % (FLUSH) 0.9 %
3 SYRINGE (ML) INJECTION EVERY 12 HOURS SCHEDULED
Status: DISCONTINUED | OUTPATIENT
Start: 2020-09-16 | End: 2020-09-17 | Stop reason: HOSPADM

## 2020-09-16 RX ORDER — SODIUM CHLORIDE, SODIUM LACTATE, POTASSIUM CHLORIDE, CALCIUM CHLORIDE 600; 310; 30; 20 MG/100ML; MG/100ML; MG/100ML; MG/100ML
125 INJECTION, SOLUTION INTRAVENOUS CONTINUOUS
Status: DISCONTINUED | OUTPATIENT
Start: 2020-09-16 | End: 2020-09-17

## 2020-09-16 RX ORDER — LIDOCAINE HYDROCHLORIDE 15 MG/ML
INJECTION, SOLUTION EPIDURAL; INFILTRATION; INTRACAUDAL; PERINEURAL AS NEEDED
Status: DISCONTINUED | OUTPATIENT
Start: 2020-09-16 | End: 2020-09-16

## 2020-09-16 RX ORDER — TERBUTALINE SULFATE 1 MG/ML
0.25 INJECTION, SOLUTION SUBCUTANEOUS ONCE
Status: COMPLETED | OUTPATIENT
Start: 2020-09-17 | End: 2020-09-16

## 2020-09-16 RX ORDER — ROPIVACAINE HYDROCHLORIDE 2 MG/ML
INJECTION, SOLUTION EPIDURAL; INFILTRATION; PERINEURAL AS NEEDED
Status: DISCONTINUED | OUTPATIENT
Start: 2020-09-16 | End: 2020-09-17 | Stop reason: SURG

## 2020-09-16 RX ADMIN — SODIUM CHLORIDE, POTASSIUM CHLORIDE, SODIUM LACTATE AND CALCIUM CHLORIDE 125 ML/HR: 600; 310; 30; 20 INJECTION, SOLUTION INTRAVENOUS at 22:02

## 2020-09-16 RX ADMIN — LIDOCAINE HYDROCHLORIDE 2 ML: 15 INJECTION, SOLUTION EPIDURAL; INFILTRATION; INTRACAUDAL; PERINEURAL at 20:57

## 2020-09-16 RX ADMIN — LIDOCAINE HYDROCHLORIDE 3 ML: 15 INJECTION, SOLUTION EPIDURAL; INFILTRATION; INTRACAUDAL; PERINEURAL at 21:01

## 2020-09-16 RX ADMIN — SODIUM CHLORIDE, POTASSIUM CHLORIDE, SODIUM LACTATE AND CALCIUM CHLORIDE 1000 ML: 600; 310; 30; 20 INJECTION, SOLUTION INTRAVENOUS at 13:38

## 2020-09-16 RX ADMIN — ROPIVACAINE HYDROCHLORIDE 5 ML: 2 INJECTION, SOLUTION EPIDURAL; INFILTRATION at 21:06

## 2020-09-16 RX ADMIN — SODIUM CHLORIDE 500 ML: 9 INJECTION, SOLUTION INTRAVENOUS at 22:55

## 2020-09-16 RX ADMIN — OXYTOCIN 2 MILLI-UNITS/MIN: 10 INJECTION, SOLUTION INTRAMUSCULAR; INTRAVENOUS at 13:47

## 2020-09-16 RX ADMIN — Medication 10 ML/HR: at 21:05

## 2020-09-16 RX ADMIN — TERBUTALINE SULFATE 0.25 MG: 1 INJECTION SUBCUTANEOUS at 23:20

## 2020-09-16 RX ADMIN — SODIUM CHLORIDE, POTASSIUM CHLORIDE, SODIUM LACTATE AND CALCIUM CHLORIDE 125 ML/HR: 600; 310; 30; 20 INJECTION, SOLUTION INTRAVENOUS at 19:35

## 2020-09-16 NOTE — ANESTHESIA PREPROCEDURE EVALUATION
Anesthesia Evaluation     history of anesthetic complications:               Airway   Mallampati: II  TM distance: >3 FB  Neck ROM: full  Comment: Tongue ring  Dental    (+) upper dentures    Pulmonary    (+) a smoker Current,   Cardiovascular     (-) hypertension, valvular problems/murmurs      Neuro/Psych  GI/Hepatic/Renal/Endo    (+)   hepatitis C, liver disease,     Musculoskeletal     Abdominal    Substance History   (+) drug use (in remission)     OB/GYN    (+) Pregnant (40wks 4 days),         Other                      Anesthesia Plan    ASA 2     epidural     intravenous induction     Anesthetic plan, all risks, benefits, and alternatives have been provided, discussed and informed consent has been obtained with: patient.

## 2020-09-16 NOTE — LACTATION NOTE
Mother plans to breastfeed, advised that lactation services were available, phone number written on white board. Mother denies any needs at this time.

## 2020-09-16 NOTE — H&P
H&P Note    Patient Identification:  Name: Demi Ag  Age: 30 y.o.  Sex: female  :  1990  MRN: [id number]                       Chief Complaint: Induction of labor    History of Present Illness:   30-year-old  6 para 2 presents at 40-4/7 weeks for induction of labor.  She has been counseled regarding the benefits, risks and alternatives to this.  Her questions have been answered and she would like to proceed.  Fetal heart tones are reactive.  Group B strep status is negative.    Problem List:  [unfilled]  Past Medical History:  Past Medical History:   Diagnosis Date   • Bronchitis    • Infectious viral hepatitis C     Past Surgical History:  Past Surgical History:   Procedure Laterality Date   • ANKLE SURGERY     • TONSILLECTOMY        Home Meds:  Medications Prior to Admission   Medication Sig Dispense Refill Last Dose   • ferrous sulfate 325 (65 FE) MG tablet Take 1 tablet by mouth 2 (Two) Times a Day With Meals. 60 tablet 6    • Prenatal Vit-Iron Carbonyl-FA (PRENATAL PLUS IRON) 29-1 MG tablet Take 1 tablet by mouth Daily. 30 tablet 11      Current Meds:   [unfilled]  Allergies:  No Known Allergies  Immunizations:    There is no immunization history on file for this patient.  Social History:   Social History     Tobacco Use   • Smoking status: Current Every Day Smoker   Substance Use Topics   • Alcohol use: Not Currently      Family History:  Family History   Problem Relation Age of Onset   • Breast cancer Maternal Grandmother         Review of Systems  A comprehensive review of systems was negative.    Objective:  tMax 24 hrs: No data recorded.    Vitals Ranges:   BP: (118)/(70) 118/70  Intake and Output Last 3 Shifts:   No intake/output data recorded.    Exam:     General Appearance:    Alert, cooperative, no distress, appears stated age   Head:    Normocephalic, without obvious abnormality, atraumatic   Back:     Symmetric, no curvature, ROM normal, no CVA tenderness   Lungs:     Clear to  auscultation bilaterally, respirations unlabored   Chest Wall:    No tenderness or deformity    Heart:    Regular rate and rhythm, S1 and S2 normal, no murmur, rub   or gallop       Abdomen:     Soft, non-tender, bowel sounds active all four quadrants,     no masses, no organomegaly   Pelvic examination: There is no blood in the vaginal vault.  The cervix is 50% effaced and 9 weeks 1 cm dilated with the presenting part -2        Extremities:   Extremities normal, atraumatic, no cyanosis or edema   Skin:   Skin color, texture, turgor normal, no rashes or lesions   Lymph nodes:   Cervical, supraclavicular, and axillary nodes normal       Data Review:    Lab Results (last 24 hours)     Procedure Component Value Units Date/Time    COVID PRE-OP / PRE-PROCEDURE SCREENING ORDER (NO ISOLATION) - Swab, Nasal Cavity [[id number]]  (Normal) Collected: 09/16/20 1234    Specimen: Swab from Nasal Cavity Updated: 09/16/20 1321    Narrative:      The following orders were created for panel order COVID PRE-OP / PRE-PROCEDURE SCREENING ORDER (NO ISOLATION) - Swab, Nasal Cavity.  Procedure                               Abnormality         Status                     ---------                               -----------         ------                     COVID-19, [facility] IN-HOUS...[[id number]]  Normal              Final result                 Please view results for these tests on the individual orders.    COVID-19, [facility] IN-HOUSE,NP Swab (NO TRANSPORT MEDIA) 2 HR TAT - Swab, Nasal Cavity [[id number]]  (Normal) Collected: 09/16/20 1234    Specimen: Swab from Nasal Cavity Updated: 09/16/20 1321     COVID19 Not Detected    Narrative:      Fact sheet for providers: https://www.fda.gov/media/171300/download     Fact sheet for patients: https://www.fda.gov/media/821884/download        Assessment:    Post-dates pregnancy      1.  Intrauterine pregnancy at 40-4/7 weeks  2.  Patient desires induction of labor.  She has been counseled regarding the  benefits, risks and alternatives.  Her questions have been answered and she would like to proceed.  Fetal heart tones are reactive.  Group B strep status is negative.    Plan:  Induction of labor using Pitocin.    Benny Boateng MD  9/16/2020

## 2020-09-17 PROBLEM — Z98.891 S/P CESAREAN SECTION: Status: ACTIVE | Noted: 2020-09-17

## 2020-09-17 PROCEDURE — 59515 CESAREAN DELIVERY: CPT | Performed by: OBSTETRICS & GYNECOLOGY

## 2020-09-17 PROCEDURE — 25010000003 CEFAZOLIN IN DEXTROSE 2-4 GM/100ML-% SOLUTION: Performed by: OBSTETRICS & GYNECOLOGY

## 2020-09-17 PROCEDURE — 25010000002 PHENYLEPHRINE PER 1 ML: Performed by: ANESTHESIOLOGY

## 2020-09-17 PROCEDURE — 25010000002 KETOROLAC TROMETHAMINE PER 15 MG: Performed by: ANESTHESIOLOGY

## 2020-09-17 PROCEDURE — 25010000002 TERBUTALINE PER 1 MG: Performed by: OBSTETRICS & GYNECOLOGY

## 2020-09-17 PROCEDURE — 88307 TISSUE EXAM BY PATHOLOGIST: CPT

## 2020-09-17 PROCEDURE — C1755 CATHETER, INTRASPINAL: HCPCS

## 2020-09-17 PROCEDURE — 0503F POSTPARTUM CARE VISIT: CPT | Performed by: OBSTETRICS & GYNECOLOGY

## 2020-09-17 PROCEDURE — 25010000002 ONDANSETRON PER 1 MG: Performed by: ANESTHESIOLOGY

## 2020-09-17 PROCEDURE — 25010000002 MORPHINE PER 10 MG: Performed by: ANESTHESIOLOGY

## 2020-09-17 RX ORDER — ONDANSETRON 4 MG/1
4 TABLET, FILM COATED ORAL EVERY 8 HOURS PRN
Status: DISCONTINUED | OUTPATIENT
Start: 2020-09-17 | End: 2020-09-19 | Stop reason: HOSPADM

## 2020-09-17 RX ORDER — OXYTOCIN-SODIUM CHLORIDE 0.9% IV SOLN 30 UNIT/500ML 30-0.9/5 UT/ML-%
250 SOLUTION INTRAVENOUS CONTINUOUS
Status: DISPENSED | OUTPATIENT
Start: 2020-09-17 | End: 2020-09-17

## 2020-09-17 RX ORDER — MISOPROSTOL 200 UG/1
800 TABLET ORAL AS NEEDED
Status: DISCONTINUED | OUTPATIENT
Start: 2020-09-17 | End: 2020-09-19 | Stop reason: HOSPADM

## 2020-09-17 RX ORDER — MORPHINE SULFATE 1 MG/ML
INJECTION, SOLUTION EPIDURAL; INTRATHECAL; INTRAVENOUS AS NEEDED
Status: DISCONTINUED | OUTPATIENT
Start: 2020-09-17 | End: 2020-09-17 | Stop reason: SURG

## 2020-09-17 RX ORDER — LIDOCAINE HYDROCHLORIDE AND EPINEPHRINE 20; 5 MG/ML; UG/ML
INJECTION, SOLUTION EPIDURAL; INFILTRATION; INTRACAUDAL; PERINEURAL AS NEEDED
Status: DISCONTINUED | OUTPATIENT
Start: 2020-09-17 | End: 2020-09-17 | Stop reason: SURG

## 2020-09-17 RX ORDER — KETOROLAC TROMETHAMINE 30 MG/ML
INJECTION, SOLUTION INTRAMUSCULAR; INTRAVENOUS AS NEEDED
Status: DISCONTINUED | OUTPATIENT
Start: 2020-09-17 | End: 2020-09-17 | Stop reason: SURG

## 2020-09-17 RX ORDER — METHYLERGONOVINE MALEATE 0.2 MG/ML
200 INJECTION INTRAVENOUS ONCE AS NEEDED
Status: DISCONTINUED | OUTPATIENT
Start: 2020-09-17 | End: 2020-09-17

## 2020-09-17 RX ORDER — OXYCODONE HYDROCHLORIDE AND ACETAMINOPHEN 5; 325 MG/1; MG/1
1 TABLET ORAL EVERY 4 HOURS PRN
Status: DISCONTINUED | OUTPATIENT
Start: 2020-09-17 | End: 2020-09-19 | Stop reason: HOSPADM

## 2020-09-17 RX ORDER — IBUPROFEN 600 MG/1
600 TABLET ORAL EVERY 8 HOURS PRN
Status: DISCONTINUED | OUTPATIENT
Start: 2020-09-17 | End: 2020-09-19 | Stop reason: HOSPADM

## 2020-09-17 RX ORDER — OXYTOCIN-SODIUM CHLORIDE 0.9% IV SOLN 30 UNIT/500ML 30-0.9/5 UT/ML-%
999 SOLUTION INTRAVENOUS ONCE
Status: DISCONTINUED | OUTPATIENT
Start: 2020-09-17 | End: 2020-09-19 | Stop reason: HOSPADM

## 2020-09-17 RX ORDER — CEFAZOLIN SODIUM 2 G/100ML
2 INJECTION, SOLUTION INTRAVENOUS ONCE
Status: COMPLETED | OUTPATIENT
Start: 2020-09-17 | End: 2020-09-17

## 2020-09-17 RX ORDER — HYDROXYZINE 50 MG/1
50 TABLET, FILM COATED ORAL EVERY 6 HOURS PRN
Status: DISCONTINUED | OUTPATIENT
Start: 2020-09-17 | End: 2020-09-19 | Stop reason: HOSPADM

## 2020-09-17 RX ORDER — OXYTOCIN-SODIUM CHLORIDE 0.9% IV SOLN 30 UNIT/500ML 30-0.9/5 UT/ML-%
999 SOLUTION INTRAVENOUS ONCE
Status: DISCONTINUED | OUTPATIENT
Start: 2020-09-17 | End: 2020-09-17 | Stop reason: HOSPADM

## 2020-09-17 RX ORDER — DOCUSATE SODIUM 100 MG/1
100 CAPSULE, LIQUID FILLED ORAL 2 TIMES DAILY
Status: DISCONTINUED | OUTPATIENT
Start: 2020-09-17 | End: 2020-09-19 | Stop reason: HOSPADM

## 2020-09-17 RX ORDER — OXYTOCIN-SODIUM CHLORIDE 0.9% IV SOLN 30 UNIT/500ML 30-0.9/5 UT/ML-%
250 SOLUTION INTRAVENOUS CONTINUOUS
Status: ACTIVE | OUTPATIENT
Start: 2020-09-17 | End: 2020-09-17

## 2020-09-17 RX ORDER — OXYTOCIN-SODIUM CHLORIDE 0.9% IV SOLN 30 UNIT/500ML 30-0.9/5 UT/ML-%
125 SOLUTION INTRAVENOUS CONTINUOUS PRN
Status: DISCONTINUED | OUTPATIENT
Start: 2020-09-17 | End: 2020-09-19 | Stop reason: HOSPADM

## 2020-09-17 RX ORDER — PHYTONADIONE 1 MG/.5ML
INJECTION, EMULSION INTRAMUSCULAR; INTRAVENOUS; SUBCUTANEOUS
Status: DISPENSED
Start: 2020-09-17 | End: 2020-09-17

## 2020-09-17 RX ORDER — ACETAMINOPHEN 500 MG
1000 TABLET ORAL ONCE
Status: COMPLETED | OUTPATIENT
Start: 2020-09-17 | End: 2020-09-17

## 2020-09-17 RX ORDER — METHYLERGONOVINE MALEATE 0.2 MG/ML
200 INJECTION INTRAVENOUS ONCE AS NEEDED
Status: DISCONTINUED | OUTPATIENT
Start: 2020-09-17 | End: 2020-09-19 | Stop reason: HOSPADM

## 2020-09-17 RX ORDER — FAMOTIDINE 10 MG/ML
20 INJECTION, SOLUTION INTRAVENOUS ONCE AS NEEDED
Status: DISCONTINUED | OUTPATIENT
Start: 2020-09-17 | End: 2020-09-17 | Stop reason: HOSPADM

## 2020-09-17 RX ORDER — CARBOPROST TROMETHAMINE 250 UG/ML
250 INJECTION, SOLUTION INTRAMUSCULAR AS NEEDED
Status: DISCONTINUED | OUTPATIENT
Start: 2020-09-17 | End: 2020-09-19 | Stop reason: HOSPADM

## 2020-09-17 RX ORDER — OXYTOCIN-SODIUM CHLORIDE 0.9% IV SOLN 30 UNIT/500ML 30-0.9/5 UT/ML-%
125 SOLUTION INTRAVENOUS CONTINUOUS PRN
Status: COMPLETED | OUTPATIENT
Start: 2020-09-17 | End: 2020-09-17

## 2020-09-17 RX ORDER — ERYTHROMYCIN 5 MG/G
OINTMENT OPHTHALMIC
Status: DISPENSED
Start: 2020-09-17 | End: 2020-09-17

## 2020-09-17 RX ORDER — LANOLIN
CREAM (ML) TOPICAL
Status: DISCONTINUED | OUTPATIENT
Start: 2020-09-17 | End: 2020-09-19 | Stop reason: HOSPADM

## 2020-09-17 RX ORDER — OXYCODONE AND ACETAMINOPHEN 10; 325 MG/1; MG/1
1 TABLET ORAL EVERY 4 HOURS PRN
Status: DISCONTINUED | OUTPATIENT
Start: 2020-09-17 | End: 2020-09-19 | Stop reason: HOSPADM

## 2020-09-17 RX ADMIN — KETOROLAC TROMETHAMINE 30 MG: 30 INJECTION, SOLUTION INTRAMUSCULAR; INTRAVENOUS at 01:02

## 2020-09-17 RX ADMIN — OXYCODONE HYDROCHLORIDE AND ACETAMINOPHEN 1 TABLET: 10; 325 TABLET ORAL at 14:48

## 2020-09-17 RX ADMIN — DOCUSATE SODIUM 100 MG: 100 CAPSULE, LIQUID FILLED ORAL at 09:01

## 2020-09-17 RX ADMIN — OXYTOCIN 250 ML/HR: 10 INJECTION INTRAVENOUS at 00:54

## 2020-09-17 RX ADMIN — CEFAZOLIN SODIUM 2 G: 2 INJECTION, SOLUTION INTRAVENOUS at 00:12

## 2020-09-17 RX ADMIN — PHENYLEPHRINE HYDROCHLORIDE 100 MCG: 10 INJECTION INTRAVENOUS at 00:54

## 2020-09-17 RX ADMIN — AZITHROMYCIN DIHYDRATE 500 MG: 500 INJECTION, POWDER, LYOPHILIZED, FOR SOLUTION INTRAVENOUS at 00:29

## 2020-09-17 RX ADMIN — DOCUSATE SODIUM 100 MG: 100 CAPSULE, LIQUID FILLED ORAL at 20:32

## 2020-09-17 RX ADMIN — OXYCODONE HYDROCHLORIDE AND ACETAMINOPHEN 1 TABLET: 10; 325 TABLET ORAL at 23:32

## 2020-09-17 RX ADMIN — IBUPROFEN 600 MG: 600 TABLET, FILM COATED ORAL at 19:03

## 2020-09-17 RX ADMIN — MORPHINE SULFATE 2.5 MG: 1 INJECTION, SOLUTION EPIDURAL; INTRATHECAL; INTRAVENOUS at 01:03

## 2020-09-17 RX ADMIN — OXYCODONE HYDROCHLORIDE AND ACETAMINOPHEN 1 TABLET: 10; 325 TABLET ORAL at 04:02

## 2020-09-17 RX ADMIN — PHENYLEPHRINE HYDROCHLORIDE 100 MCG: 10 INJECTION INTRAVENOUS at 00:50

## 2020-09-17 RX ADMIN — FAMOTIDINE 20 MG: 10 INJECTION INTRAVENOUS at 00:13

## 2020-09-17 RX ADMIN — PHENYLEPHRINE HYDROCHLORIDE 100 MCG: 10 INJECTION INTRAVENOUS at 00:52

## 2020-09-17 RX ADMIN — LIDOCAINE HYDROCHLORIDE,EPINEPHRINE BITARTRATE 5 ML: 20; .005 INJECTION, SOLUTION EPIDURAL; INFILTRATION; INTRACAUDAL; PERINEURAL at 00:10

## 2020-09-17 RX ADMIN — OXYCODONE HYDROCHLORIDE AND ACETAMINOPHEN 1 TABLET: 10; 325 TABLET ORAL at 19:03

## 2020-09-17 RX ADMIN — ACETAMINOPHEN 1000 MG: 500 TABLET ORAL at 00:13

## 2020-09-17 RX ADMIN — PHENYLEPHRINE HYDROCHLORIDE 200 MCG: 10 INJECTION INTRAVENOUS at 00:48

## 2020-09-17 RX ADMIN — OXYCODONE HYDROCHLORIDE AND ACETAMINOPHEN 1 TABLET: 10; 325 TABLET ORAL at 09:01

## 2020-09-17 RX ADMIN — LIDOCAINE HYDROCHLORIDE,EPINEPHRINE BITARTRATE 5 ML: 20; .005 INJECTION, SOLUTION EPIDURAL; INFILTRATION; INTRACAUDAL; PERINEURAL at 00:19

## 2020-09-17 RX ADMIN — OXYTOCIN 999 ML/HR: 10 INJECTION, SOLUTION INTRAMUSCULAR; INTRAVENOUS at 00:36

## 2020-09-17 RX ADMIN — IBUPROFEN 600 MG: 600 TABLET, FILM COATED ORAL at 09:01

## 2020-09-17 RX ADMIN — ONDANSETRON 4 MG: 2 INJECTION INTRAMUSCULAR; INTRAVENOUS at 00:12

## 2020-09-17 RX ADMIN — OXYTOCIN 125 ML/HR: 10 INJECTION, SOLUTION INTRAMUSCULAR; INTRAVENOUS at 02:02

## 2020-09-17 RX ADMIN — LIDOCAINE HYDROCHLORIDE,EPINEPHRINE BITARTRATE 5 ML: 20; .005 INJECTION, SOLUTION EPIDURAL; INFILTRATION; INTRACAUDAL; PERINEURAL at 00:14

## 2020-09-17 NOTE — ANESTHESIA POSTPROCEDURE EVALUATION
"Patient: Demi Ag    Procedure Summary     Date: 20 Room / Location:  [facility]    Anesthesia Start:  Anesthesia Stop: 20    Procedure:  SECTION PRIMARY (N/A Abdomen) Diagnosis: (fetal intolerance to labor)    Surgeon: Benny Boateng MD Provider: Eunice Treviño MD    Anesthesia Type: epidural ASA Status: 2          Anesthesia Type: epidural    Vitals  Vitals Value Taken Time   /74 20 0246   Temp 37.1 °C (98.7 °F) 20 221   Pulse 85 20 0257   Resp 16 20 0245   SpO2 100 % 20           Post Anesthesia Care and Evaluation    Patient location during evaluation: bedside  Patient participation: complete - patient participated  Level of consciousness: awake and alert  Pain management: adequate  Airway patency: patent  Anesthetic complications: No anesthetic complications  PONV Status: NA  Cardiovascular status: acceptable and hemodynamically stable  Respiratory status: acceptable  Hydration status: acceptable  Post Neuraxial Block status: No signs or symptoms of PDPH  Comments: /74 (BP Location: Right arm, Patient Position: Lying)   Pulse 85   Temp 37.1 °C (98.7 °F) (Oral)   Resp 16   Ht 157.5 cm (62\")   LMP 2019   SpO2 100%   Breastfeeding Unknown   BMI 24.62 kg/m²       "

## 2020-09-17 NOTE — PROGRESS NOTES
The patient is comfortable with her epidural.  Contractions are every 2 to 4 minutes.  Fetal heart tones have good long-term variability and accelerations.  There are also intermittent variable decelerations.  Counseled.  On cervix examination the cervix is 70% effaced and 2 cm dilated.  There is a fore bag.  Amniotomy of the fore bag was performed.  Intrauterine pressure catheter was placed.  We will give an amnioinfusion.  The patient tolerated the procedure well.

## 2020-09-17 NOTE — L&D DELIVERY NOTE
Operative Note      Demi Ag  30 y.o.  1990  female  [id number]      2020    Surgeon(s) and Role:     * Benny Boateng MD - Primary     Pre-op Diagnosis:   fetal intolerance to labor           Post-operative Diagnosis: Same                   Assist= Dr Torres, who was integral to the performance of the procedure.  He assisted with exposure, delivery of the infant and separation of layers.  Findings/Complications:  Vigorous male  weighing 7 pounds 10 ounces.  Apgars 8, 9.  The cord was wrapped around the leg.    Description of procedure:  Procedure(s) and Anesthesia Type:     *  SECTION PRIMARY - Epidural  The patient was taken to the operating room with an epidural catheter infusing.  She was placed in a comfortable supine position on the operating room table.  The abdomen was prepped and draped in the usual sterile fashion and adequacy of anesthesia was confirmed using the Allis test.  A Pfannenstiel incision was made.  Dissection was carried sharply down to the fascia, which was incised in the midline.  The superior and inferior fascial flaps were created.  Rectus muscles were  and the peritoneal cavity was entered.  The uterovesical fold of peritoneum was grasped and incised.  Bladder flap was then taken down.  A transverse incision was made in the lower uterine segment.  This was extended laterally in both directions.      The fetal head was delivered from direct occiput posterior presentation.  Mouth and naris were suctioned with a bulb while on the abdomen.  The shoulders were delivered without difficulty, followed by the remainder the infant.  The cord was wrapped around the infant's right leg.  After delay of 30 seconds, the cord was doubly clamped and divided.  The infant was then handed to the waiting  team, who assigned Apgars of 8 and 9.      The placenta was expressed.  It was sent to pathology as a specimen.  Moist laparotomy sponges were then used to  express any remaining clots and debris.  Attention was turned to the uterine incision.  This was approximated with a locked running suture of 0 Vicryl.  A second layer was placed, imbricating the first.  Incision was examined.  There was a small bleeding point to the left of center.  This was approximated with 0 Vicryl suture ligature in a figure-of-eight fashion.  This brought about complete hemostasis.  The pelvis was then irrigated with large amounts of warm water and the uterus was reduced back into the abdomen.  The incision was reexamined without tension.  It was hemostatic.      At this point, all laparotomy sponges and instruments were removed and all counts were correct.  Attention was turned to closure.  The muscle was approximated with interrupted Vicryl sutures.  The fascia was approximated with a running 0 Vicryl.  Subcutaneous tissue was examined and small bleeding points were controlled with electrocautery.  The subcutaneous was approximated with 3-0 plain gut.  The skin was approximated with 4-0 Vicryl in a running subcuticular fashion.  The patient was taken to recovery in stable condition.  Anesthesia= epidural    Estimated Blood Loss: Quantitative estimate of blood loss is pending at the time of this dictation    Specimens: * No orders in the log *    [unfilled]      Benny Boateng MD  9/17/2020

## 2020-09-17 NOTE — PROGRESS NOTES
[facility]   PROGRESS NOTE    Post-Op Day 0 S/P   Subjective   Subjective  Patient reports:  Pain is well controlled with prescription NSAID's including ibuprofen (Motrin) and narcotic analgesics including oxycodone/acetaminophen (Percocet, Tylox).  She is  ambulating. Tolerating diet. Tolerating po -- normal.  Intake -- c/o of tolerating po solids and tolerating po liquids.   Voiding - Dunn in; no flatus reported..  Vaginal bleeding is light.    Objective    Objective     Vitals: Vital Signs Range for the last 24 hours  Temperature: Temp:  [97.8 °F (36.6 °C)-99.5 °F (37.5 °C)] 98.9 °F (37.2 °C)   Temp Source: Temp src: Oral   BP: BP: ()/(49-94) 109/68   Pulse: Heart Rate:  [] 106   Respirations: Resp:  [16] 16   SPO2: SpO2:  [97 %-100 %] 98 %   O2 Amount (l/min):     O2 Devices     Weight:              Physical Exam    Lungs clear to auscultation bilaterally   Abdomen Abnormal shape: distended  Percussion: Abnormal percussion: tympanitic  Fundus firm, NT   Incision  Bandage clean and dry   Extremities extremities normal, atraumatic, no cyanosis or edema     I reviewed the patient's new clinical results.    Assessment/Plan        Post-dates pregnancy    Assessment & Plan    Assessment:    Demi Ag is Day 0  post-partum  , Low Transverse   .      Plan:  remove dressing, remove urine catheter, saline lock IV fluids and continue post op care.      Bill Kim MD  20  11:51 EDT

## 2020-09-17 NOTE — ANESTHESIA PROCEDURE NOTES
Labor Epidural      Patient location during procedure: OR  Start Time: 9/16/2020 8:38 PM  Performed By  Anesthesiologist: Eunice Treviño MD  Preanesthetic Checklist  Completed: patient identified, pre-op evaluation, timeout performed, IV checked, risks and benefits discussed and monitors and equipment checked  Prep:  Pt Position:sitting  Sterile Tech:gloves, mask and sterile barrier  Prep:chlorhexidine gluconate and isopropyl alcohol  Monitoring:blood pressure monitoring and EKG  Epidural Block Procedure:  Approach:midline  Guidance:landmark technique  Location:L2-L3  Needle Type:Tuohy  Needle Gauge:18  Paresthesia: none  Aspiration:negative  Test Dose:negative  Number of Attempts: 1  Post Assessment:  Dressing:occlusive dressing applied and secured with tape  Pt Tolerance:patient tolerated the procedure well with no apparent complications  Complications:no

## 2020-09-17 NOTE — PLAN OF CARE
Goal Outcome Evaluation:  Plan of Care Reviewed With: patient  Progress: improving  Outcome Summary: term induction turned  delivery for fetal intolerance, pain management, bleeding well controlled, plans to transfer to mother baby, will continue to monitor  VS stable, rubra scant, arita cath, IV infusing, umbilicus firm 1 below the U, patient resting, will continue to monitor

## 2020-09-17 NOTE — PROGRESS NOTES
The patient had an episode of recurrent variable and late decelerations.  This was treated with positioning, IV fluids, amnioinfusion and ultimately terbutaline.  This brought about resolution of the repetitive decelerations.  Fetal heart tones are now in the 130s to 140s.  There are no decelerations.  There is long-term variability.  Counseled.  We will observe for now.  If the strip regains category 1 status, resume induction.  If the monitor strip returns to nonreassuring status,  delivery could be required.

## 2020-09-17 NOTE — PROGRESS NOTES
Fetal heart tones are in the 130s.  Long-term variability remains decreased to absent.  Cervix is 5 cm dilated.  There is no response to scalp stimulation.  In view of nonreassuring fetal heart rate status remote from delivery, I recommend  delivery.  I counseled the patient and answered her questions.  She agrees to proceed.

## 2020-09-17 NOTE — PLAN OF CARE
Problem: Adult Inpatient Plan of Care  Goal: Plan of Care Review  Outcome: Ongoing, Progressing  Flowsheets  Taken 2020 0237 by Tahmina Edwards, RN  Outcome Summary: term induction turned  delivery for fetal intolerance, pain management, bleeding well controlled, plans to transfer to mother baby, will continue to monitor  Taken 2020 by Pina Holden, RN  Progress: improving  Plan of Care Reviewed With: patient     Problem: Adult Inpatient Plan of Care  Goal: Patient-Specific Goal (Individualized)  Outcome: Ongoing, Progressing  Flowsheets (Taken 2020 by Pina Holden RN)  Patient-Specific Goals (Include Timeframe): epidural for pain control  Individualized Care Needs: bottlefeeding  Anxieties, Fears or Concerns: none     Problem: Adult Inpatient Plan of Care  Goal: Absence of Hospital-Acquired Illness or Injury  Outcome: Ongoing, Progressing     Problem: Adult Inpatient Plan of Care  Goal: Absence of Hospital-Acquired Illness or Injury  Intervention: Identify and Manage Fall Risk  Flowsheets  Taken 2020 0146  Safety Promotion/Fall Prevention: safety round/check completed  Taken 2020  Safety Promotion/Fall Prevention: safety round/check completed  Taken 2020 1913  Safety Promotion/Fall Prevention: safety round/check completed     Problem: Adult Inpatient Plan of Care  Goal: Absence of Hospital-Acquired Illness or Injury  Intervention: Prevent Skin Injury  Flowsheets (Taken 2020 0237)  Body Position: turned     Problem: Adult Inpatient Plan of Care  Goal: Absence of Hospital-Acquired Illness or Injury  Intervention: Prevent and Manage VTE (venous thromboembolism) Risk  Flowsheets  Taken 2020 0233  VTE Prevention/Management:   bilateral   sequential compression devices on  Taken 2020 0218  VTE Prevention/Management:   bilateral   sequential compression devices on  Taken 2020 0202  VTE Prevention/Management:   bilateral   sequential  compression devices on  Taken 9/17/2020 0147  VTE Prevention/Management:   bilateral   sequential compression devices on  Taken 9/17/2020 0140  VTE Prevention/Management:   bilateral   sequential compression devices on  Taken 9/17/2020 0115  VTE Prevention/Management:   bilateral   compression stockings on  Taken 9/17/2020 0022  VTE Prevention/Management:   bilateral   sequential compression devices on     Problem: Adult Inpatient Plan of Care  Goal: Absence of Hospital-Acquired Illness or Injury  Intervention: Prevent Infection  Flowsheets (Taken 9/17/2020 0237)  Infection Prevention:   hand hygiene promoted   personal protective equipment utilized   single patient room provided     Problem: Adult Inpatient Plan of Care  Goal: Optimal Comfort and Wellbeing  Outcome: Ongoing, Progressing     Problem: Adult Inpatient Plan of Care  Goal: Optimal Comfort and Wellbeing  Intervention: Provide Person-Centered Care  Flowsheets (Taken 9/16/2020 1913)  Trust Relationship/Rapport:   care explained   choices provided   emotional support provided   empathic listening provided   questions answered   questions encouraged   reassurance provided   thoughts/feelings acknowledged     Problem: Adult Inpatient Plan of Care  Goal: Readiness for Transition of Care  Outcome: Ongoing, Progressing     Problem: Adult Inpatient Plan of Care  Goal: Readiness for Transition of Care  Intervention: Mutually Develop Transition Plan  Flowsheets  Taken 9/17/2020 0237 by Tahmina Edwards, RN  Equipment Needed After Discharge: none  Anticipated Changes Related to Illness: none  Concerns to be Addressed: no discharge needs identified  Readmission Within the Last 30 Days: no previous admission in last 30 days  Taken 9/16/2020 1345 by Pina Holden, RN  Equipment Currently Used at Home: none  Taken 9/16/2020 1342 by Pina Holden, RN  Transportation Anticipated: family or friend will provide  Transportation Concerns: rides, unreliable from  others  Patient/Family Anticipated Services at Transition:   Patient/Family Anticipates Transition to: home with family     Problem:  Fall Injury Risk  Goal: Absence of Fall, Infant Drop and Related Injury  Outcome: Ongoing, Progressing     Problem:  Fall Injury Risk  Goal: Absence of Fall, Infant Drop and Related Injury  Intervention: Identify and Manage Contributors to Fall Injury Risk  Flowsheets (Taken 2020 0237)  Medication Review/Management: medications reviewed     Problem:  Fall Injury Risk  Goal: Absence of Fall, Infant Drop and Related Injury  Intervention: Promote Injury-Free Environment  Flowsheets  Taken 2020 0146  Safety Promotion/Fall Prevention: safety round/check completed  Taken 2020 2126  Safety Promotion/Fall Prevention: safety round/check completed  Taken 2020 1913  Safety Promotion/Fall Prevention: safety round/check completed     Problem: Skin Injury Risk Increased  Goal: Skin Health and Integrity  Outcome: Ongoing, Progressing     Problem: Change in Fetal Wellbeing ( Delivery)  Goal: Stable Fetal Wellbeing  Outcome: Ongoing, Progressing     Problem: Change in Fetal Wellbeing ( Delivery)  Goal: Stable Fetal Wellbeing  Intervention: Promote and Monitor Fetal Wellbeing  Flowsheets (Taken 2020 0237)  Body Position: turned     Problem: Bleeding ( Delivery)  Goal: Bleeding is Controlled  Outcome: Met     Problem: Infection ( Delivery)  Goal: Absence of Infection Signs and Symptoms  Outcome: Met     Problem: Respiratory Compromise ( Delivery)  Goal: Effective Oxygenation and Ventilation  Outcome: Met     Problem: Bleeding (Labor)  Goal: Hemostasis  Outcome: Unable to Meet, Plan Revised     Problem: Change in Fetal Wellbeing (Labor)  Goal: Stable Fetal Wellbeing  Outcome: Unable to Meet, Plan Revised  Intervention: Promote and Monitor Fetal Wellbeing  Recent Flowsheet Documentation  Taken 2020  0237 by Tahmina Edwards, RN  Body Position: turned     Problem: Delayed Labor Progression (Labor)  Goal: Effective Progression to Delivery  Outcome: Unable to Meet, Plan Revised     Problem: Infection (Labor)  Goal: Absence of Infection Signs and Symptoms  Outcome: Unable to Meet, Plan Revised  Intervention: Prevent or Manage Intraamniotic Infection  Flowsheets (Taken 2020)  Infection Prevention:   hand hygiene promoted   personal protective equipment utilized   single patient room provided     Problem: Labor Pain (Labor)  Goal: Acceptable Pain Control  Outcome: Unable to Meet, Plan Revised  Intervention: Prevent or Manage Pain  Recent Flowsheet Documentation  Taken 2020 by Tahmina Edwards, RN  Pain Management Interventions: (Epidural ) --     Problem: Uterine Tachysystole (Labor)  Goal: Normal Uterine Contraction Pattern  Outcome: Unable to Meet, Plan Revised   Goal Outcome Evaluation:  Plan of Care Reviewed With: patient  Progress: improving  Outcome Summary: term induction turned  delivery for fetal intolerance, pain management, bleeding well controlled, plans to transfer to mother baby, will continue to monitor  Problem: Bleeding (Labor)  Goal: Hemostasis  Outcome: Unable to Meet, Plan Revised     Problem: Change in Fetal Wellbeing (Labor)  Goal: Stable Fetal Wellbeing  Outcome: Unable to Meet, Plan Revised     Problem: Delayed Labor Progression (Labor)  Goal: Effective Progression to Delivery  Outcome: Unable to Meet, Plan Revised     Problem: Infection (Labor)  Goal: Absence of Infection Signs and Symptoms  Outcome: Unable to Meet, Plan Revised     Problem: Labor Pain (Labor)  Goal: Acceptable Pain Control  Outcome: Unable to Meet, Plan Revised     Problem: Uterine Tachysystole (Labor)  Goal: Normal Uterine Contraction Pattern  Outcome: Unable to Meet, Plan Revised

## 2020-09-18 LAB
BASOPHILS # BLD AUTO: 0.03 10*3/MM3 (ref 0–0.2)
BASOPHILS NFR BLD AUTO: 0.4 % (ref 0–1.5)
BUPRENORPHINE UR QL: NEGATIVE NG/ML
DEPRECATED RDW RBC AUTO: 41.8 FL (ref 37–54)
EOSINOPHIL # BLD AUTO: 0 10*3/MM3 (ref 0–0.4)
EOSINOPHIL NFR BLD AUTO: 0 % (ref 0.3–6.2)
ERYTHROCYTE [DISTWIDTH] IN BLOOD BY AUTOMATED COUNT: 13.5 % (ref 12.3–15.4)
HCT VFR BLD AUTO: 21.4 % (ref 34–46.6)
HGB BLD-MCNC: 7.2 G/DL (ref 12–15.9)
IMM GRANULOCYTES # BLD AUTO: 0.04 10*3/MM3 (ref 0–0.05)
IMM GRANULOCYTES NFR BLD AUTO: 0.5 % (ref 0–0.5)
LYMPHOCYTES # BLD AUTO: 1.42 10*3/MM3 (ref 0.7–3.1)
LYMPHOCYTES NFR BLD AUTO: 19 % (ref 19.6–45.3)
MCH RBC QN AUTO: 28.9 PG (ref 26.6–33)
MCHC RBC AUTO-ENTMCNC: 33.6 G/DL (ref 31.5–35.7)
MCV RBC AUTO: 85.9 FL (ref 79–97)
MONOCYTES # BLD AUTO: 0.49 10*3/MM3 (ref 0.1–0.9)
MONOCYTES NFR BLD AUTO: 6.6 % (ref 5–12)
NEUTROPHILS NFR BLD AUTO: 5.5 10*3/MM3 (ref 1.7–7)
NEUTROPHILS NFR BLD AUTO: 73.5 % (ref 42.7–76)
NRBC BLD AUTO-RTO: 0 /100 WBC (ref 0–0.2)
PLATELET # BLD AUTO: 162 10*3/MM3 (ref 140–450)
PMV BLD AUTO: 9.9 FL (ref 6–12)
RBC # BLD AUTO: 2.49 10*6/MM3 (ref 3.77–5.28)
WBC # BLD AUTO: 7.48 10*3/MM3 (ref 3.4–10.8)

## 2020-09-18 PROCEDURE — 0503F POSTPARTUM CARE VISIT: CPT | Performed by: NURSE PRACTITIONER

## 2020-09-18 PROCEDURE — 85025 COMPLETE CBC W/AUTO DIFF WBC: CPT | Performed by: OBSTETRICS & GYNECOLOGY

## 2020-09-18 RX ADMIN — OXYCODONE HYDROCHLORIDE AND ACETAMINOPHEN 1 TABLET: 10; 325 TABLET ORAL at 05:07

## 2020-09-18 RX ADMIN — DOCUSATE SODIUM 100 MG: 100 CAPSULE, LIQUID FILLED ORAL at 19:46

## 2020-09-18 RX ADMIN — IBUPROFEN 600 MG: 600 TABLET, FILM COATED ORAL at 05:07

## 2020-09-18 RX ADMIN — IBUPROFEN 600 MG: 600 TABLET, FILM COATED ORAL at 15:17

## 2020-09-18 RX ADMIN — OXYCODONE HYDROCHLORIDE AND ACETAMINOPHEN 1 TABLET: 10; 325 TABLET ORAL at 22:13

## 2020-09-18 RX ADMIN — DOCUSATE SODIUM 100 MG: 100 CAPSULE, LIQUID FILLED ORAL at 09:16

## 2020-09-18 RX ADMIN — OXYCODONE HYDROCHLORIDE AND ACETAMINOPHEN 1 TABLET: 10; 325 TABLET ORAL at 15:17

## 2020-09-18 RX ADMIN — OXYCODONE HYDROCHLORIDE AND ACETAMINOPHEN 1 TABLET: 10; 325 TABLET ORAL at 09:16

## 2020-09-18 NOTE — PROGRESS NOTES
Discharge Planning Assessment  [facility]     Patient Name: Demi Ag  MRN: [id number]  Today's Date: 9/18/2020    Admit Date: 9/16/2020    Discharge Needs Assessment    No documentation.       Discharge Plan     Row Name 09/18/20 1350       Plan    Plan  Infant to discharge home with mother when medically ready. CSW will follow for cord toxicology results. DAPHNE Cervantes    Plan Comments  …continued….Mother denies any prior or current CPS involvement. Mother plans on infant follow up with [facility] on [address]. Mother is comfortable scheduling appointments and reports she will have transportation to appointments as well. Mother reports she has been sober for a little over 3 years. Mother reports she completed the [facility] Program at [place] and is now a peer leader/instructor with the program. CSW provided encouragement and support to mother for her sobriety. Of note, both mother and infant had negative urine toxicology screens on admission. Cord toxicology was sent, CSW will follow for cord toxicology results and will report results to CPS if warranted. CSW informed mother of cord toxicology screen being sent and that if positive for substances, a CPS report would be made at that time. Mother expressed understanding. Mother reports they will have a ride home at discharge. Mother denies any other needs or concerns. [facility] Program discussed and mother declines referral at this time. CSW provided mother with resources packet and educated mother briefly about resources in packet. Packet includes info on: [facility] program, HANDS, WIC, transportation, infant supplies, counseling, domestic violence, housing/homeless shelters, food/financial/clothing assistance, and general community resources. CSW will follow for cord toxicology and will report to CPS if warranted. DAPHNE Cervantes    Row Name 09/18/20 2822       Plan    Plan Comments  Mother: Demi Ag, MRN [id number];  Infant: Paul Ag, MRN [id number]. CSW was consulted for “hx of drug abuse. mom and baby uds neg on admit. pt hep c positive.” CSW spoke with KEYLA Kirk who advised mother and [place] [facility] and that they do not have transportation or access to food. CSW met with mother at bedside. Father of infant, Gilmar, was also present at bedside. Mother declined to speak privately with CSW. Of note, mother holding and interacting with infant appropriately during discussion. Mother verified address, phone and insurance. Mother reports they have a room at [facility] through the transitional housing program with [employer]. Mother reports they pay rent for room and that they are working with [employer] to obtain more permanent housing. Mother reports she has spoken with Mian about adding infant to insurance coverage. Mother reports they have car seat (which was present in the room). Mother reports they have a bassinette and pack n play for infant, clothes, diapers, and other infant supplies. Mother is current with [facility]. Mother reports she also receives food stamps so they will be able to buy formula with food stamps if needed until able to get infant added to WIC. Mother reports a good support system with father of infant, maternal grandmother, and other extended family members. Mother reports her other 2 children are staying with maternal grandmother until they are able to get more permanent housing. …continued….        Continued Care and Services - Admitted Since 9/16/2020    Coordination has not been started for this encounter.         Demographic Summary     Row Name 09/18/20 0289       General Information    Admission Type  inpatient    Arrived From  home    Referral Source  nursing    Reason for Consult  substance use concerns;psychosocial concerns;housing concerns/homeless    General Information Comments  hx of drug abuse. mom and baby uds neg on admit. pt hep c positive         Functional Status     Row Name 09/18/20 1348       Mental Status    General Appearance WDL  WDL       Mental Status Summary    Recent Changes in Mental Status/Cognitive Functioning  unable to assess        Psychosocial     Row Name 09/18/20 1348       Behavior WDL    Behavior WDL  WDL       Emotion Mood WDL    Emotion/Mood/Affect WDL  WDL       Speech WDL    Speech WDL  WDL       Perceptual State WDL    Perceptual State WDL  WDL       Thought Process WDL    Thought Process WDL  WDL       Intellectual Performance WDL    Intellectual Performance WDL  WDL       Coping/Stress    Major Change/Loss/Stressor  birth;housing concerns    Patient Personal Strengths  able to adapt;courageous;flexibility;future/goal oriented;motivated;positive attitude;resilient;resourceful;strong support system    Sources of Support  parent(s);other family members;significant other        Abuse/Neglect     Row Name 09/18/20 1350       Personal Safety    Physical Signs of Abuse Present  no        Legal    No documentation.       Substance Abuse    No documentation.       Patient Forms    No documentation.           PARAG Cervantes

## 2020-09-18 NOTE — PROGRESS NOTES
Section Progress Note    Assessment/Plan     1. Status post  section: Doing well postoperatively.   Continue current care. Plan discharge tomorrow. Desires to start depoprovera prior to d/c home  2. History of drug abuse  3. Hepatitis C+  4. Anemia  H&H 7.2 & 21.4 down from 9.7&28.7, will repeat in AM  Pt is asymptomatic    Rh status: B+  Rubella: Immune  Gender: Male, desires circumcision  Covid: Negative    Subjective     Postpartum Day 1:  Delivery    The patient feels well. The patient denies emotional concerns. Pain is well controlled with current medications. The baby iswell.Urinary output is adequate. The patient is ambulating well. The patient is tolerating a normal diet. Patient reports passing flatus.    Objective     Vital signs in last 24 hours:  Temp:  [97.8 °F (36.6 °C)-99.3 °F (37.4 °C)] 97.8 °F (36.6 °C)  Heart Rate:  [] 85  Resp:  [16-18] 16  BP: (104-127)/(68-77) 105/70      General:    alert, appears stated age and cooperative   CV: RRR, no m/r/g   Lungs: CTAB, no wheezes, no respiratory distress   Bowel Sounds:  active   Lochia:  appropriate   Uterine Fundus:   firm   Incision:  healing well, no significant drainage, no dehiscence, no significant erythema   DVT Evaluation:  No evidence of DVT seen on physical exam.     Lab Results   Component Value Date    WBC 7.88 2020    HGB 9.7 (L) 2020    HCT 28.7 (L) 2020    MCV 84.9 2020     2020         Jayda Brewer, APRN  2020  08:10 EDT

## 2020-09-19 VITALS
TEMPERATURE: 97.7 F | DIASTOLIC BLOOD PRESSURE: 65 MMHG | SYSTOLIC BLOOD PRESSURE: 99 MMHG | BODY MASS INDEX: 24.62 KG/M2 | RESPIRATION RATE: 16 BRPM | HEART RATE: 90 BPM | HEIGHT: 62 IN | OXYGEN SATURATION: 99 %

## 2020-09-19 LAB
BASOPHILS # BLD AUTO: 0.02 10*3/MM3 (ref 0–0.2)
BASOPHILS NFR BLD AUTO: 0.4 % (ref 0–1.5)
DEPRECATED RDW RBC AUTO: 42.7 FL (ref 37–54)
EOSINOPHIL # BLD AUTO: 0.01 10*3/MM3 (ref 0–0.4)
EOSINOPHIL NFR BLD AUTO: 0.2 % (ref 0.3–6.2)
ERYTHROCYTE [DISTWIDTH] IN BLOOD BY AUTOMATED COUNT: 13.5 % (ref 12.3–15.4)
HCT VFR BLD AUTO: 23.1 % (ref 34–46.6)
HGB BLD-MCNC: 7.8 G/DL (ref 12–15.9)
IMM GRANULOCYTES # BLD AUTO: 0.02 10*3/MM3 (ref 0–0.05)
IMM GRANULOCYTES NFR BLD AUTO: 0.4 % (ref 0–0.5)
LYMPHOCYTES # BLD AUTO: 1.36 10*3/MM3 (ref 0.7–3.1)
LYMPHOCYTES NFR BLD AUTO: 26.1 % (ref 19.6–45.3)
MCH RBC QN AUTO: 29.1 PG (ref 26.6–33)
MCHC RBC AUTO-ENTMCNC: 33.8 G/DL (ref 31.5–35.7)
MCV RBC AUTO: 86.2 FL (ref 79–97)
MONOCYTES # BLD AUTO: 0.3 10*3/MM3 (ref 0.1–0.9)
MONOCYTES NFR BLD AUTO: 5.8 % (ref 5–12)
NEUTROPHILS NFR BLD AUTO: 3.5 10*3/MM3 (ref 1.7–7)
NEUTROPHILS NFR BLD AUTO: 67.1 % (ref 42.7–76)
NRBC BLD AUTO-RTO: 0 /100 WBC (ref 0–0.2)
PLATELET # BLD AUTO: 206 10*3/MM3 (ref 140–450)
PMV BLD AUTO: 9.6 FL (ref 6–12)
RBC # BLD AUTO: 2.68 10*6/MM3 (ref 3.77–5.28)
WBC # BLD AUTO: 5.21 10*3/MM3 (ref 3.4–10.8)

## 2020-09-19 PROCEDURE — 85025 COMPLETE CBC W/AUTO DIFF WBC: CPT | Performed by: NURSE PRACTITIONER

## 2020-09-19 PROCEDURE — 0503F POSTPARTUM CARE VISIT: CPT | Performed by: OBSTETRICS & GYNECOLOGY

## 2020-09-19 RX ORDER — OXYCODONE HYDROCHLORIDE AND ACETAMINOPHEN 5; 325 MG/1; MG/1
1 TABLET ORAL EVERY 6 HOURS PRN
Qty: 30 TABLET | Refills: 0 | Status: SHIPPED | OUTPATIENT
Start: 2020-09-19 | End: 2021-12-17

## 2020-09-19 RX ORDER — IBUPROFEN 600 MG/1
600 TABLET ORAL EVERY 8 HOURS PRN
Qty: 50 TABLET | Refills: 3 | Status: SHIPPED | OUTPATIENT
Start: 2020-09-19 | End: 2020-11-02 | Stop reason: SDUPTHER

## 2020-09-19 RX ORDER — MEDROXYPROGESTERONE ACETATE 150 MG/ML
150 INJECTION, SUSPENSION INTRAMUSCULAR ONCE
Status: COMPLETED | OUTPATIENT
Start: 2020-09-19 | End: 2020-09-19

## 2020-09-19 RX ADMIN — OXYCODONE HYDROCHLORIDE AND ACETAMINOPHEN 1 TABLET: 10; 325 TABLET ORAL at 09:02

## 2020-09-19 RX ADMIN — IBUPROFEN 600 MG: 600 TABLET, FILM COATED ORAL at 02:05

## 2020-09-19 RX ADMIN — OXYCODONE HYDROCHLORIDE AND ACETAMINOPHEN 1 TABLET: 10; 325 TABLET ORAL at 12:51

## 2020-09-19 RX ADMIN — IBUPROFEN 600 MG: 600 TABLET, FILM COATED ORAL at 11:20

## 2020-09-19 RX ADMIN — DOCUSATE SODIUM 100 MG: 100 CAPSULE, LIQUID FILLED ORAL at 09:02

## 2020-09-19 RX ADMIN — OXYCODONE HYDROCHLORIDE AND ACETAMINOPHEN 1 TABLET: 10; 325 TABLET ORAL at 02:04

## 2020-09-19 RX ADMIN — MEDROXYPROGESTERONE ACETATE 150 MG: 150 INJECTION, SUSPENSION, EXTENDED RELEASE INTRAMUSCULAR at 12:21

## 2020-09-19 NOTE — PROGRESS NOTES
Section Progress Note    Assessment/Plan     Status post  section: Doing well postoperatively.     1) postpartum care immediately after delivery : Doing well, desires discharge home.   2) Hep C + - follow up with PCP   3) Anemia, severe but stable at Hg of 7.8 grams today. So no transfusion. Supplement iron and suggested slow and methodic about position changes.     Rh status: Rh+  Rubella: immune  Gender: male  COVID 19 ND       Subjective     Postpartum Day 2:  Delivery    The patient feels well. The patient denies emotional concerns. Pain is well controlled with current medications. The baby is well.Urinary output is adequate. The patient is ambulating well. The patient is tolerating a normal diet. Patient reports flatus.    Objective     Vital signs in last 24 hours:  Temp:  [97.7 °F (36.5 °C)-98.3 °F (36.8 °C)] 97.7 °F (36.5 °C)  Heart Rate:  [90-99] 90  Resp:  [16] 16  BP: ()/(65-72) 99/65      General:    alert, appears stated age and cooperative   Bowel Sounds:  active   Lochia:  appropriate   Uterine Fundus:   firm   Incision:  healing well, no significant drainage, no dehiscence, no significant erythema   DVT Evaluation:  No evidence of DVT seen on physical exam.     Lab Results   Component Value Date    WBC 5.21 2020    HGB 7.8 (L) 2020    HCT 23.1 (L) 2020    MCV 86.2 2020     2020         Gilmar Soni MD  2020  11:26 EDT

## 2020-09-19 NOTE — DISCHARGE INSTRUCTIONS
Pelvic rest for 6 weeks, no heavy lifting for 6 weeks   No tub baths, swimming or driving for 2 weeks

## 2020-09-19 NOTE — DISCHARGE SUMMARY
Date of Discharge:  2020    Discharge Diagnosis: postpartum care immediately after delivery     Presenting Problem/History of Present Illness  Post-dates pregnancy [O48.0]       Hospital Course  Patient is a 30 y.o. female  40w5d who ended up with primary  for fetal intolerance to labor.  For events surrounding her delivery please see delivery/op note.  Her postpartum course was uneventful and today POD#2, she is ready for discharge.  She meets all milestones and criteria for discharge and instructions were reviewed and she voiced understanding.     Procedures Performed  Procedure(s):   SECTION PRIMARY       Consults:   Consults     No orders found from 2020 to 2020.          Pertinent Test Results: Hg of 7.8 grams     Condition on Discharge:  Stable     Discharge Disposition  Home or Self Care    Discharge Medications     Discharge Medications      New Medications      Instructions Start Date   ibuprofen 600 MG tablet  Commonly known as: ADVIL,MOTRIN   600 mg, Oral, Every 8 Hours PRN      oxyCODONE-acetaminophen 5-325 MG per tablet  Commonly known as: PERCOCET   1 tablet, Oral, Every 6 Hours PRN         Continue These Medications      Instructions Start Date   clotrimazole-betamethasone 1-0.05 % cream  Commonly known as: LOTRISONE   No dose, route, or frequency recorded.      ferrous sulfate 325 (65 FE) MG tablet   325 mg, Oral, 2 Times Daily With Meals      Prenatal Plus Iron 29-1 MG tablet   1 tablet, Oral, Daily         Stop These Medications    acetaminophen 325 MG tablet  Commonly known as: TYLENOL            Discharge Diet:   Diet Instructions     Advance Diet As Tolerated            Activity at Discharge:   Activity Instructions     Other Instructions (Specify)      No driving or tub baths for 2 weeks, No heavy lifting and pelvic rest for 6 weeks     Pelvic Rest            Follow-up Appointments  Future Appointments   Date Time Provider Department Center   10/29/2020  11:45 AM Jolly Dailey MD [facility] None     Additional Instructions for the Follow-ups that You Need to Schedule     Discharge Follow-up with Specialty: Dr. Boateng; 2 Weeks   As directed      Specialty: Dr. Boateng    Follow Up: 2 Weeks               Test Results Pending at Discharge       Gilmar Soni MD  09/19/20  11:33 EDT

## 2020-09-22 NOTE — PROGRESS NOTES
Continued Stay Note  [facility]     Patient Name: Demi Ag  MRN: [id number]  Today's Date: 9/22/2020    Admit Date: 9/16/2020    Discharge Plan     Row Name 09/22/20 0927       Plan    Plan Comments  Umbilical cord on baby came back 9/22/20 and results are negative.        Discharge Codes    No documentation.       Expected Discharge Date and Time     Expected Discharge Date Expected Discharge Time    Sep 19, 2020             Tanya Styles

## 2020-10-05 ENCOUNTER — OFFICE VISIT (OUTPATIENT)
Dept: OBSTETRICS AND GYNECOLOGY | Facility: CLINIC | Age: 30
End: 2020-10-05

## 2020-10-05 VITALS
HEIGHT: 62 IN | DIASTOLIC BLOOD PRESSURE: 64 MMHG | BODY MASS INDEX: 22.38 KG/M2 | SYSTOLIC BLOOD PRESSURE: 116 MMHG | WEIGHT: 121.6 LBS

## 2020-10-05 DIAGNOSIS — Z09 POSTOP CHECK: Primary | ICD-10-CM

## 2020-10-05 PROCEDURE — 99024 POSTOP FOLLOW-UP VISIT: CPT | Performed by: OBSTETRICS & GYNECOLOGY

## 2020-10-05 NOTE — PROGRESS NOTES
MIKE Ag  is a 30 y.o. female who presents for a 2-week postoperative checkup.  She had a  delivery.  Her baby is doing well.  The baby is bottlefeeding and also the patient is pumping breastmilk.  She is feeling well.  Bowels and bladder are functioning normally.  She has occasional incisional pains, but reports that these are mild.  No nausea or vomiting.  No fever or chills.    Chief Complaint   Patient presents with   • Post-op     Patient is here for a 2 week postop to recheck c section incision.       Past Medical History:   Diagnosis Date   • Bronchitis    • Infectious viral hepatitis C       Past Surgical History:   Procedure Laterality Date   • ANKLE SURGERY     •  SECTION N/A 2020    Procedure:  SECTION PRIMARY;  Surgeon: Benny Boateng MD;  Location: [facility] LABOR DELIVERY;  Service: Obstetrics/Gynecology;  Laterality: N/A;   • TONSILLECTOMY         Social History     Socioeconomic History   • Marital status: Single     Spouse name: Not on file   • Number of children: Not on file   • Years of education: Not on file   • Highest education level: Not on file   Social Needs   • Financial resource strain: Very hard   • Food insecurity     Worry: Never true     Inability: Never true   • Transportation needs     Medical: No     Non-medical: No   Tobacco Use   • Smoking status: Current Every Day Smoker   Substance and Sexual Activity   • Alcohol use: Not Currently   • Drug use: Not Currently   • Sexual activity: Yes     Partners: Male   Lifestyle   • Physical activity     Days per week: 0 days     Minutes per session: 0 min   • Stress: Only a little       The following portions of the patient's history were reviewed and updated as appropriate: allergies, current medications, past family history, past medical history, past social history, past surgical history and problem list.    Review of Systems          Physical Exam  Vitals signs and nursing note reviewed.   Abdominal:       Comments: Soft, nondistended.  Pfannenstiel incision is well approximated.  Erythema and induration are absent.  There is slight amount of edema at the superior margin of the incision.  This is not indurated, nor is it erythematous.  It is nontender to palpation.  There is no fascial defect palpable.   Neurological:      Mental Status: She is alert and oriented to person, place, and time.         Assessment    Demi was seen today for post-op.    Diagnoses and all orders for this visit:    Postop check        Plan  1. Appropriate progress, 2 weeks postop.  There is likely a small fluid collection at the superior portion of the incision in the middle.  This is not tender and does not appear to be infected on exam.  Counseled and questions answered.  The patient declines a follow-up in 2 weeks.  Instead, she will call for any redness, firmness, tenderness, fever, chills or other concerns.  The patient will follow-up in 4 weeks for a postpartum checkup.  2. Contraceptive counseling.  The patient is using Depo-Provera.  The first injection was given in the hospital.    3. Return in about 4 weeks (around 11/2/2020).    Social History     Tobacco Use   Smoking Status Current Every Day Smoker   4.

## 2020-11-02 ENCOUNTER — OFFICE VISIT (OUTPATIENT)
Dept: OBSTETRICS AND GYNECOLOGY | Facility: CLINIC | Age: 30
End: 2020-11-02

## 2020-11-02 VITALS
WEIGHT: 128.8 LBS | HEIGHT: 62 IN | SYSTOLIC BLOOD PRESSURE: 114 MMHG | BODY MASS INDEX: 23.7 KG/M2 | DIASTOLIC BLOOD PRESSURE: 62 MMHG

## 2020-11-02 DIAGNOSIS — L98.9 SKIN LESION: ICD-10-CM

## 2020-11-02 PROCEDURE — 11102 TANGNTL BX SKIN SINGLE LES: CPT | Performed by: OBSTETRICS & GYNECOLOGY

## 2020-11-02 PROCEDURE — 99214 OFFICE O/P EST MOD 30 MIN: CPT | Performed by: OBSTETRICS & GYNECOLOGY

## 2020-11-02 RX ORDER — MEDROXYPROGESTERONE ACETATE 150 MG/ML
150 INJECTION, SUSPENSION INTRAMUSCULAR
Qty: 1 EACH | Refills: 3 | Status: SHIPPED | OUTPATIENT
Start: 2020-11-02 | End: 2021-06-30 | Stop reason: SDUPTHER

## 2020-11-02 RX ORDER — IBUPROFEN 600 MG/1
600 TABLET ORAL EVERY 8 HOURS PRN
Qty: 50 TABLET | Refills: 3 | Status: SHIPPED | OUTPATIENT
Start: 2020-11-02 | End: 2021-02-03 | Stop reason: SDUPTHER

## 2020-11-02 NOTE — PROGRESS NOTES
MIKE Ag  is a 30 y.o. female who presents for 2 reasons.  First, she is here for her 6-week postpartum checkup.  She underwent a  delivery for fetal intolerance to labor.  She reports that her baby is doing well.  The baby is bottlefeeding.  The patient also also doing well.  Bowels and bladder are functioning normally.  There is no surgical pain.  Next, the patient is using Depo-Provera for contraception.  She is satisfied with this.  Next, the patient has a skin lesion on the inner left thigh.  She would like this to be removed in the office today.  I counseled the patient regarding the procedure itself as well as its risks and benefits and alternatives.  I answered the patient's questions and she would like to proceed.    Chief Complaint   Patient presents with   • Postpartum Care     Patient is here for a 6 week postpartum.       Past Medical History:   Diagnosis Date   • Bronchitis    • Infectious viral hepatitis C       Past Surgical History:   Procedure Laterality Date   • ANKLE SURGERY     •  SECTION N/A 2020    Procedure:  SECTION PRIMARY;  Surgeon: Benny Boateng MD;  Location: [facility] LABOR DELIVERY;  Service: Obstetrics/Gynecology;  Laterality: N/A;   • TONSILLECTOMY         Social History     Socioeconomic History   • Marital status: Single     Spouse name: Not on file   • Number of children: Not on file   • Years of education: Not on file   • Highest education level: Not on file   Social Needs   • Financial resource strain: Very hard   • Food insecurity     Worry: Never true     Inability: Never true   • Transportation needs     Medical: No     Non-medical: No   Tobacco Use   • Smoking status: Current Every Day Smoker   Substance and Sexual Activity   • Alcohol use: Not Currently   • Drug use: Not Currently   • Sexual activity: Yes     Partners: Male   Lifestyle   • Physical activity     Days per week: 0 days     Minutes per session: 0 min   • Stress: Only a  little       The following portions of the patient's history were reviewed and updated as appropriate: allergies, current medications, past family history, past medical history, past social history, past surgical history and problem list.    Review of Systems  This is negative for fever or chills.  Negative for nausea or vomiting.  Negative for abdominal or pelvic pain.  Negative for abnormal bleeding.  Positive for a skin lesion of the upper thigh.  All other systems are reviewed and are negative.        Physical Exam  Vitals signs and nursing note reviewed.   Constitutional:       Appearance: She is well-developed.   HENT:      Head: Normocephalic and atraumatic.   Cardiovascular:      Rate and Rhythm: Normal rate and regular rhythm.   Pulmonary:      Effort: Pulmonary effort is normal.      Breath sounds: Normal breath sounds. No wheezing or rales.   Chest:      Comments: The breasts are homogeneous.  There are no palpable lumps.  Nipple discharge and axillary adenopathy are absent.  Abdominal:      General: There is no distension.      Palpations: Abdomen is soft.      Tenderness: There is no abdominal tenderness.   Genitourinary:     Labia:         Right: No lesion.         Left: No lesion.       Vagina: Normal. No vaginal discharge.      Cervix: No cervical motion tenderness.      Adnexa:         Right: No mass or tenderness.          Left: No mass or tenderness.     Skin:     General: Skin is warm and dry.             Comments: There is a raised lesion on the inner left thigh approximately 4 cm in diameter.  This was removed at the patient's request.  Sterile technique was observed.  Xylocaine was used for local anesthetic.  The lesion was excised and sent to pathology.  The incision site was closed with a 3-0 Monocryl suture.  The patient will follow-up in 1 week for suture removal.  She tolerated the procedure well.   Neurological:      Mental Status: She is alert and oriented to person, place, and time.          Assessment    Diagnoses and all orders for this visit:    1. Routine postpartum follow-up (Primary)    2. Skin lesion        Plan  1. Appropriate progress, 6 weeks postpartum.  Okay to resume all normal activities of daily living  2. Contraceptive counseling.  The patient is using Depo-Provera and is very satisfied with this.  We discussed the benefits, risks and alternatives to this.  The patient would like to continue.  A prescription has been sent.  3. The patient reports headaches every day.  This has been present for the last 2 weeks.  It is not position dependent.  I recommend a hemoglobin and hematocrit, as the patient was anemic during pregnancy.  If this is normal, the patient will follow up with her primary care physician for further work-up.  I answered the patient's questions and she agrees with these recommendations.  4. Skin lesion was removed from the patient's left thigh as described above.  The patient tolerated the procedure well.  She will follow-up in 1 week for suture removal.    5. Return in about 1 week (around 11/9/2020).    Social History     Tobacco Use   Smoking Status Current Every Day Smoker   6.

## 2020-11-03 LAB
HCT VFR BLD AUTO: 33.8 % (ref 34–46.6)
HGB BLD-MCNC: 10.6 G/DL (ref 12–15.9)

## 2020-11-04 ENCOUNTER — TELEPHONE (OUTPATIENT)
Dept: OBSTETRICS AND GYNECOLOGY | Facility: CLINIC | Age: 30
End: 2020-11-04

## 2020-11-06 LAB
DX ICD CODE: NORMAL
PATH REPORT.FINAL DX SPEC: NORMAL
PATH REPORT.GROSS SPEC: NORMAL
PATH REPORT.SITE OF ORIGIN SPEC: NORMAL
PATHOLOGIST NAME: NORMAL
PAYMENT PROCEDURE: NORMAL

## 2020-12-07 ENCOUNTER — CLINICAL SUPPORT (OUTPATIENT)
Dept: OBSTETRICS AND GYNECOLOGY | Facility: CLINIC | Age: 30
End: 2020-12-07

## 2020-12-07 DIAGNOSIS — Z30.42 ENCOUNTER FOR SURVEILLANCE OF INJECTABLE CONTRACEPTIVE: Primary | ICD-10-CM

## 2020-12-07 PROCEDURE — 96372 THER/PROPH/DIAG INJ SC/IM: CPT | Performed by: OBSTETRICS & GYNECOLOGY

## 2020-12-07 RX ORDER — MEDROXYPROGESTERONE ACETATE 150 MG/ML
150 INJECTION, SUSPENSION INTRAMUSCULAR ONCE
Status: COMPLETED | OUTPATIENT
Start: 2020-12-07 | End: 2020-12-07

## 2020-12-07 RX ADMIN — MEDROXYPROGESTERONE ACETATE 150 MG: 150 INJECTION, SUSPENSION INTRAMUSCULAR at 12:01

## 2021-02-04 RX ORDER — IBUPROFEN 600 MG/1
600 TABLET ORAL EVERY 8 HOURS PRN
Qty: 50 TABLET | Refills: 3 | Status: SHIPPED | OUTPATIENT
Start: 2021-02-04 | End: 2021-07-06 | Stop reason: SDUPTHER

## 2021-03-01 ENCOUNTER — CLINICAL SUPPORT (OUTPATIENT)
Dept: OBSTETRICS AND GYNECOLOGY | Facility: CLINIC | Age: 31
End: 2021-03-01

## 2021-03-01 VITALS — WEIGHT: 154.8 LBS | BODY MASS INDEX: 28.31 KG/M2

## 2021-03-01 DIAGNOSIS — Z30.42 ENCOUNTER FOR SURVEILLANCE OF INJECTABLE CONTRACEPTIVE: Primary | ICD-10-CM

## 2021-03-01 PROCEDURE — 96372 THER/PROPH/DIAG INJ SC/IM: CPT | Performed by: OBSTETRICS & GYNECOLOGY

## 2021-03-01 RX ORDER — MEDROXYPROGESTERONE ACETATE 150 MG/ML
150 INJECTION, SUSPENSION INTRAMUSCULAR ONCE
Status: COMPLETED | OUTPATIENT
Start: 2021-03-01 | End: 2021-03-01

## 2021-03-01 RX ADMIN — MEDROXYPROGESTERONE ACETATE 150 MG: 150 INJECTION, SUSPENSION INTRAMUSCULAR at 11:53

## 2021-03-01 NOTE — PROGRESS NOTES
Patient is here for her Depo-Provera 150 mg. Patient supplied.  Patient tolerated injection without reaction.    NDC: 0955-1765-50  Lot: [id number]  Exp: 06/30/2022    Left Deltoid IM

## 2021-04-16 ENCOUNTER — BULK ORDERING (OUTPATIENT)
Dept: CASE MANAGEMENT | Facility: OTHER | Age: 31
End: 2021-04-16

## 2021-04-16 DIAGNOSIS — Z23 IMMUNIZATION DUE: ICD-10-CM

## 2021-06-30 RX ORDER — MEDROXYPROGESTERONE ACETATE 150 MG/ML
150 INJECTION, SUSPENSION INTRAMUSCULAR
Qty: 1 EACH | Refills: 3 | Status: SHIPPED | OUTPATIENT
Start: 2021-06-30 | End: 2022-05-02 | Stop reason: SDUPTHER

## 2021-07-07 RX ORDER — IBUPROFEN 600 MG/1
600 TABLET ORAL EVERY 8 HOURS PRN
Qty: 50 TABLET | Refills: 0 | Status: SHIPPED | OUTPATIENT
Start: 2021-07-07 | End: 2021-09-24 | Stop reason: SDUPTHER

## 2021-09-27 RX ORDER — IBUPROFEN 600 MG/1
600 TABLET ORAL EVERY 8 HOURS PRN
Qty: 50 TABLET | Refills: 0 | Status: SHIPPED | OUTPATIENT
Start: 2021-09-27 | End: 2021-10-04

## 2021-10-04 RX ORDER — IBUPROFEN 600 MG/1
TABLET ORAL
Qty: 50 TABLET | Refills: 0 | Status: SHIPPED | OUTPATIENT
Start: 2021-10-04 | End: 2021-10-26 | Stop reason: SDUPTHER

## 2021-10-26 RX ORDER — IBUPROFEN 600 MG/1
600 TABLET ORAL EVERY 8 HOURS PRN
Qty: 20 TABLET | Refills: 0 | Status: SHIPPED | OUTPATIENT
Start: 2021-10-26 | End: 2021-12-01

## 2021-10-26 NOTE — TELEPHONE ENCOUNTER
Aimee, Dr. Boateng's patient and due for annual in November. Only a few sent, he can decide how to treat. Thanks, Dr. Soni

## 2021-12-01 RX ORDER — IBUPROFEN 600 MG/1
TABLET ORAL
Qty: 50 TABLET | Refills: 0 | Status: SHIPPED | OUTPATIENT
Start: 2021-12-01 | End: 2021-12-15 | Stop reason: SDUPTHER

## 2021-12-01 NOTE — TELEPHONE ENCOUNTER
Med refill. AE 12/17/2021. Dr Soni sent in prescription before but your patient. [facility] at [facility] and [place] on file

## 2021-12-15 RX ORDER — IBUPROFEN 600 MG/1
600 TABLET ORAL EVERY 8 HOURS PRN
Qty: 50 TABLET | Refills: 0 | Status: SHIPPED | OUTPATIENT
Start: 2021-12-15 | End: 2021-12-29

## 2021-12-17 ENCOUNTER — OFFICE VISIT (OUTPATIENT)
Dept: OBSTETRICS AND GYNECOLOGY | Facility: CLINIC | Age: 31
End: 2021-12-17

## 2021-12-17 VITALS
SYSTOLIC BLOOD PRESSURE: 114 MMHG | DIASTOLIC BLOOD PRESSURE: 76 MMHG | WEIGHT: 168 LBS | HEIGHT: 62 IN | BODY MASS INDEX: 30.91 KG/M2

## 2021-12-17 DIAGNOSIS — Z20.2 POSSIBLE EXPOSURE TO STD: ICD-10-CM

## 2021-12-17 DIAGNOSIS — R10.31 RLQ ABDOMINAL PAIN: ICD-10-CM

## 2021-12-17 DIAGNOSIS — N39.3 SUI (STRESS URINARY INCONTINENCE, FEMALE): ICD-10-CM

## 2021-12-17 DIAGNOSIS — Z00.00 ANNUAL PHYSICAL EXAM: Primary | ICD-10-CM

## 2021-12-17 PROCEDURE — 2014F MENTAL STATUS ASSESS: CPT | Performed by: OBSTETRICS & GYNECOLOGY

## 2021-12-17 PROCEDURE — 3008F BODY MASS INDEX DOCD: CPT | Performed by: OBSTETRICS & GYNECOLOGY

## 2021-12-17 PROCEDURE — 99395 PREV VISIT EST AGE 18-39: CPT | Performed by: OBSTETRICS & GYNECOLOGY
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
## 2021-12-18 LAB
HSV1 IGG SER IA-ACNC: 17.4 INDEX (ref 0–0.9)
HSV2 IGG SER IA-ACNC: <0.91 INDEX (ref 0–0.9)

## 2021-12-20 LAB — HSV1+2 IGM SER IA-ACNC: <0.91 RATIO (ref 0–0.9)

## 2021-12-22 LAB
CONV .: NORMAL
CYTOLOGIST CVX/VAG CYTO: NORMAL
CYTOLOGY CVX/VAG DOC CYTO: NORMAL
CYTOLOGY CVX/VAG DOC THIN PREP: NORMAL
DX ICD CODE: NORMAL
HIV 1 & 2 AB SER-IMP: NORMAL
OTHER STN SPEC: NORMAL
STAT OF ADQ CVX/VAG CYTO-IMP: NORMAL

## 2021-12-29 RX ORDER — IBUPROFEN 600 MG/1
TABLET ORAL
Qty: 50 TABLET | Refills: 0 | Status: SHIPPED | OUTPATIENT
Start: 2021-12-29 | End: 2022-01-12 | Stop reason: SDUPTHER

## 2022-01-03 ENCOUNTER — OFFICE VISIT (OUTPATIENT)
Dept: GASTROENTEROLOGY | Facility: CLINIC | Age: 32
End: 2022-01-03

## 2022-01-03 ENCOUNTER — LAB (OUTPATIENT)
Dept: LAB | Facility: HOSPITAL | Age: 32
End: 2022-01-03

## 2022-01-03 VITALS — HEIGHT: 62 IN | WEIGHT: 168 LBS | BODY MASS INDEX: 30.91 KG/M2

## 2022-01-03 DIAGNOSIS — Z98.891 S/P CESAREAN SECTION: ICD-10-CM

## 2022-01-03 DIAGNOSIS — B18.2 CHRONIC HEPATITIS C WITHOUT HEPATIC COMA: Primary | Chronic | ICD-10-CM

## 2022-01-03 DIAGNOSIS — F19.11 HISTORY OF DRUG ABUSE IN REMISSION: ICD-10-CM

## 2022-01-03 LAB
AMPHET+METHAMPHET UR QL: POSITIVE
BARBITURATES UR QL SCN: NEGATIVE
BENZODIAZ UR QL SCN: NEGATIVE
CANNABINOIDS SERPL QL: NEGATIVE
COCAINE UR QL: NEGATIVE
METHADONE UR QL SCN: NEGATIVE
OPIATES UR QL: NEGATIVE
OXYCODONE UR QL SCN: NEGATIVE

## 2022-01-03 PROCEDURE — 80307 DRUG TEST PRSMV CHEM ANLYZR: CPT | Performed by: INTERNAL MEDICINE

## 2022-01-03 PROCEDURE — 99214 OFFICE O/P EST MOD 30 MIN: CPT | Performed by: INTERNAL MEDICINE

## 2022-01-03 NOTE — PROGRESS NOTES
Chief Complaint   Patient presents with   • Hepatitis C       Subjective     HPI    Demi Ag is a 31 y.o. female with a past medical history noted below who presents for follow-up of hepatitis C.  She was last seen in April 2020 via telehealth visit.  She was currently pregnant at that time and advised to follow-up after delivery to discuss treatment options.  She delivered in September 2020.  She has a 15-month old baby.    Not breast feeding.  Currently using Depo Provera for birth control.  No plans for upcoming pregnancy.      Remains sober, no drug abuse in many years.    No jaundice, icterus, no right upper quadrant pain.  Otherwise feels well.    No GI malignancies in her family.  No history of liver disease in her family.    Currently vapes nicotine.  Denies MJ use.    No abdominal surgeries.        Today's visit was in the office.  Both the patient and I were wearing face masks and proper hand hygiene was performed before and after the physical exam.           Current Outpatient Medications:   •  ibuprofen (ADVIL,MOTRIN) 600 MG tablet, TAKE 1 TABLET BY MOUTH EVERY 8 HOURS AS NEEDED FOR MILD PAIN, Disp: 50 tablet, Rfl: 0  •  medroxyPROGESTERone (Depo-Provera) 150 MG/ML injection, Inject 1 mL into the appropriate muscle as directed by prescriber Every 3 (Three) Months., Disp: 1 each, Rfl: 3      Objective     There were no vitals filed for this visit.      01/03/22  0800   Weight: 76.2 kg (168 lb)     Body mass index is 30.73 kg/m².    Physical Exam  Vitals reviewed.   Constitutional:       General: She is not in acute distress.     Appearance: Normal appearance. She is well-developed. She is not diaphoretic.   HENT:      Head: Normocephalic.   Neck:      Thyroid: No thyromegaly.   Cardiovascular:      Rate and Rhythm: Normal rate and regular rhythm.   Pulmonary:      Effort: Pulmonary effort is normal. No respiratory distress.      Breath sounds: Normal breath sounds. No wheezing.   Abdominal:       General: Bowel sounds are normal. There is no distension.      Palpations: Abdomen is soft. Abdomen is not rigid. There is no mass.      Tenderness: There is no abdominal tenderness. There is no guarding or rebound.      Hernia: No hernia is present.   Genitourinary:     Comments: Rectal exam deferred  Musculoskeletal:         General: No tenderness.   Neurological:      Mental Status: She is alert and oriented to person, place, and time.      Motor: No atrophy.      Coordination: Coordination normal.   Psychiatric:         Behavior: Behavior normal.         Thought Content: Thought content normal.         Judgment: Judgment normal.             WBC   Date Value Ref Range Status   09/19/2020 5.21 3.40 - 10.80 10*3/mm3 Final   06/03/2020 9.86 4.5 - 11.0 10*3/uL Final     RBC   Date Value Ref Range Status   09/19/2020 2.68 (L) 3.77 - 5.28 10*6/mm3 Final   06/03/2020 3.08 (L) 4.0 - 5.2 10*6/uL Final     Hemoglobin   Date Value Ref Range Status   11/02/2020 10.6 (L) 12.0 - 15.9 g/dL Final   09/19/2020 7.8 (L) 12.0 - 15.9 g/dL Final   06/03/2020 9.4 (L) 12.0 - 16.0 g/dL Final     Hematocrit   Date Value Ref Range Status   11/02/2020 33.8 (L) 34.0 - 46.6 % Final   09/19/2020 23.1 (L) 34.0 - 46.6 % Final   06/03/2020 28.5 (L) 36.0 - 46.0 % Final     MCV   Date Value Ref Range Status   09/19/2020 86.2 79.0 - 97.0 fL Final   06/03/2020 92.5 80.0 - 100.0 fL Final     MCH   Date Value Ref Range Status   09/19/2020 29.1 26.6 - 33.0 pg Final   06/03/2020 30.5 26.0 - 34.0 pg Final     MCHC   Date Value Ref Range Status   09/19/2020 33.8 31.5 - 35.7 g/dL Final   06/03/2020 33.0 31.0 - 37.0 g/dL Final     RDW   Date Value Ref Range Status   09/19/2020 13.5 12.3 - 15.4 % Final   06/03/2020 13.2 12.0 - 16.8 % Final     RDW-SD   Date Value Ref Range Status   09/19/2020 42.7 37.0 - 54.0 fl Final     MPV   Date Value Ref Range Status   09/19/2020 9.6 6.0 - 12.0 fL Final   06/03/2020 9.0 6.7 - 10.8 fL Final     Platelets   Date Value Ref  Range Status   09/19/2020 206 140 - 450 10*3/mm3 Final   06/03/2020 275 140 - 440 10*3/uL Final     Neutrophil %   Date Value Ref Range Status   09/19/2020 67.1 42.7 - 76.0 % Final     Lymphocyte %   Date Value Ref Range Status   09/19/2020 26.1 19.6 - 45.3 % Final     Monocyte %   Date Value Ref Range Status   09/19/2020 5.8 5.0 - 12.0 % Final     Eosinophil %   Date Value Ref Range Status   09/19/2020 0.2 (L) 0.3 - 6.2 % Final     Basophil %   Date Value Ref Range Status   09/19/2020 0.4 0.0 - 1.5 % Final     Immature Grans %   Date Value Ref Range Status   09/19/2020 0.4 0.0 - 0.5 % Final     Neutrophils, Absolute   Date Value Ref Range Status   09/19/2020 3.50 1.70 - 7.00 10*3/mm3 Final     Lymphocytes, Absolute   Date Value Ref Range Status   09/19/2020 1.36 0.70 - 3.10 10*3/mm3 Final     Monocytes, Absolute   Date Value Ref Range Status   09/19/2020 0.30 0.10 - 0.90 10*3/mm3 Final     Eosinophils, Absolute   Date Value Ref Range Status   09/19/2020 0.01 0.00 - 0.40 10*3/mm3 Final     Basophils, Absolute   Date Value Ref Range Status   09/19/2020 0.02 0.00 - 0.20 10*3/mm3 Final     Immature Grans, Absolute   Date Value Ref Range Status   09/19/2020 0.02 0.00 - 0.05 10*3/mm3 Final     nRBC   Date Value Ref Range Status   09/19/2020 0.0 0.0 - 0.2 /100 WBC Final       Lab Results   Component Value Date    GLUCOSE 110 (H) 05/19/2020    BUN 6 (L) 06/03/2020    CREATININE 0.5 (L) 06/03/2020    EGFRIFNONA 141 05/19/2020    EGFRIFAFRI >150 05/19/2020    BCR 12.0 06/03/2020    CO2 23 06/03/2020    CALCIUM 8.2 (L) 06/03/2020    PROTENTOTREF 6.3 05/19/2020    ALBUMIN 3.90 05/19/2020    LABIL2 1.6 05/19/2020    AST 11 05/19/2020    ALT 13 05/19/2020         Imaging Results (Last 7 Days)     ** No results found for the last 168 hours. **          I personally reviewed data as detailed below:     The labs listed above.    Office notes from: 12/17/21 ob-gyn    No notes on file    Assessment/Plan    1. Chronic hepatitis C  without hepatic coma: She is treatment naïve.    2. History of drug abuse in remission: No ongoing drug abuse.    3. S/P  section: She has a 15-month-old son, she is on Depo-Provera for birth control.  She is not breast-feeding    Plan  We will update CBC, CMP, hepatitis labs today, check HIV, tox screen  We will plan for hopefully Epclusa versus Mavyret based on her insurance coverage  Discussed need for no breast-feeding, maintenance of birth control due to risks of treatment on pregnancy, discussed need for labs during the treatment period, discussed need for adherence to medications during the treatment even once she clears the virus, and discussed that she will not be immune to getting hepatitis C again in the future      Diagnoses and all orders for this visit:    1. Chronic hepatitis C without hepatic coma (HCC) (Primary)  -     Comprehensive Metabolic Panel  -     CBC & Differential  -     Hepatitis B Surface Antibody  -     Hepatitis B Surface Antigen  -     Hepatitis B Core Antibody, Total  -     Hepatitis C Genotype  -     Hepatitis C RNA, Quantitative, PCR (graph)  -     Hepatitis C Antibody  -     Urine Drug Screen - Urine, Clean Catch  -     Hepatitis A Antibody, Total  -     HCV FibroSURE    2. History of drug abuse in remission (HCC)    3. S/P  section        I have discussed the above plan with the patient.  They verbalize understanding and are in agreement with the plan.  They have been advised to contact the office for any questions, concerns, or changes related to their health.    Dictated utilizing Dragon dictation

## 2022-01-04 ENCOUNTER — TELEPHONE (OUTPATIENT)
Dept: GASTROENTEROLOGY | Facility: CLINIC | Age: 32
End: 2022-01-04

## 2022-01-04 LAB
ALBUMIN SERPL-MCNC: 4.9 G/DL (ref 3.8–4.8)
ALBUMIN/GLOB SERPL: 1.9 {RATIO} (ref 1.2–2.2)
ALP SERPL-CCNC: 63 IU/L (ref 44–121)
ALT SERPL-CCNC: 104 IU/L (ref 0–32)
AST SERPL-CCNC: 43 IU/L (ref 0–40)
BASOPHILS # BLD AUTO: 0.1 X10E3/UL (ref 0–0.2)
BASOPHILS NFR BLD AUTO: 1 %
BILIRUB SERPL-MCNC: 0.4 MG/DL (ref 0–1.2)
BUN SERPL-MCNC: 10 MG/DL (ref 6–20)
BUN/CREAT SERPL: 11 (ref 9–23)
CALCIUM SERPL-MCNC: 9.5 MG/DL (ref 8.7–10.2)
CHLORIDE SERPL-SCNC: 108 MMOL/L (ref 96–106)
CO2 SERPL-SCNC: 21 MMOL/L (ref 20–29)
CREAT SERPL-MCNC: 0.89 MG/DL (ref 0.57–1)
EOSINOPHIL # BLD AUTO: 0.1 X10E3/UL (ref 0–0.4)
EOSINOPHIL NFR BLD AUTO: 2 %
ERYTHROCYTE [DISTWIDTH] IN BLOOD BY AUTOMATED COUNT: 12.9 % (ref 11.7–15.4)
GLOBULIN SER CALC-MCNC: 2.6 G/DL (ref 1.5–4.5)
GLUCOSE SERPL-MCNC: 84 MG/DL (ref 65–99)
HAV AB SER QL IA: POSITIVE
HBV CORE AB SERPL QL IA: NEGATIVE
HBV SURFACE AB SER QL: REACTIVE
HBV SURFACE AG SERPL QL IA: NEGATIVE
HCT VFR BLD AUTO: 42.8 % (ref 34–46.6)
HCV AB S/CO SERPL IA: >11 S/CO RATIO (ref 0–0.9)
HGB BLD-MCNC: 14.2 G/DL (ref 11.1–15.9)
IMM GRANULOCYTES # BLD AUTO: 0 X10E3/UL (ref 0–0.1)
IMM GRANULOCYTES NFR BLD AUTO: 0 %
LYMPHOCYTES # BLD AUTO: 1.6 X10E3/UL (ref 0.7–3.1)
LYMPHOCYTES NFR BLD AUTO: 29 %
MCH RBC QN AUTO: 28.8 PG (ref 26.6–33)
MCHC RBC AUTO-ENTMCNC: 33.2 G/DL (ref 31.5–35.7)
MCV RBC AUTO: 87 FL (ref 79–97)
MONOCYTES # BLD AUTO: 0.4 X10E3/UL (ref 0.1–0.9)
MONOCYTES NFR BLD AUTO: 7 %
NEUTROPHILS # BLD AUTO: 3.4 X10E3/UL (ref 1.4–7)
NEUTROPHILS NFR BLD AUTO: 61 %
PLATELET # BLD AUTO: 285 X10E3/UL (ref 150–450)
POTASSIUM SERPL-SCNC: 4 MMOL/L (ref 3.5–5.2)
PROT SERPL-MCNC: 7.5 G/DL (ref 6–8.5)
RBC # BLD AUTO: 4.93 X10E6/UL (ref 3.77–5.28)
SODIUM SERPL-SCNC: 142 MMOL/L (ref 134–144)
WBC # BLD AUTO: 5.4 X10E3/UL (ref 3.4–10.8)

## 2022-01-04 NOTE — TELEPHONE ENCOUNTER
There are some medicines that can cause a false positive test.    We can plan to repeat labs prior to starting treatment.

## 2022-01-04 NOTE — TELEPHONE ENCOUNTER
----- Message from Demi Ancelmo sent at 1/4/2022 12:07 PM EST -----  Regarding: Urine screen results   Alessandra Dailey,  I am writing to you regarding my urine screen results. I am very concerned that I had a false positive result for amphetamine/methamphetamine. That’s not possible and I need to know how can we rectify this. I will do blood work and as a last resort a hair sample if that’s what is needed. I have no drugs in my system period. I’m not really sure what could cause a false positive. I am on the depo  provera birth control and only take ibuprofen 600 mg daily. That’s it. And my pot of coffee every morning. However there’s absolutely no drug use and I need to know what we or I can do to fix this situation. Look forward to hearing back from you. Thank you

## 2022-01-04 NOTE — TELEPHONE ENCOUNTER
----- Message from Verónica Abdullahi sent at 1/4/2022  8:33 AM EST -----  Regarding: Test results  Contact: [phone]  Test was positive needs to talk to nurse.

## 2022-01-05 LAB
A2 MACROGLOB SERPL-MCNC: 211 MG/DL (ref 110–276)
ALT SERPL W P-5'-P-CCNC: 122 IU/L (ref 0–40)
APO A-I SERPL-MCNC: 110 MG/DL (ref 116–209)
BILIRUB SERPL-MCNC: 0.4 MG/DL (ref 0–1.2)
FIBROSIS SCORING:: ABNORMAL
FIBROSIS STAGE SERPL QL: ABNORMAL
GGT SERPL-CCNC: 17 IU/L (ref 0–60)
HAPTOGLOB SERPL-MCNC: 126 MG/DL (ref 33–278)
HCV AB SER QL: ABNORMAL
HCV RNA SERPL NAA+PROBE-ACNC: NORMAL IU/ML
HCV RNA SERPL NAA+PROBE-LOG IU: 4.96 LOG10 IU/ML
LABORATORY COMMENT REPORT: ABNORMAL
LIVER FIBR SCORE SERPL CALC.FIBROSURE: 0.13 (ref 0–0.21)
NECROINFLAMM ACTIVITY SCORING:: ABNORMAL
NECROINFLAMMATORY ACT GRADE SERPL QL: ABNORMAL
NECROINFLAMMATORY ACT SCORE SERPL: 0.59 (ref 0–0.17)
SERVICE CMNT-IMP: ABNORMAL
TEST INFORMATION: NORMAL

## 2022-01-05 NOTE — TELEPHONE ENCOUNTER
Call to pt.  Advise per DR Dailey note.  Verb understanding.     Pt very anxious.  Asking when tx will start - willing to return any time for necessary labs.     Concerns to DR Dailey.

## 2022-01-05 NOTE — TELEPHONE ENCOUNTER
Currently, we are waiting for all of her lab results. I will forward them to Maribell to get the process started. Looking at a couple of weeks to get everything squared away.

## 2022-01-06 DIAGNOSIS — F19.11 HISTORY OF DRUG ABUSE IN REMISSION: ICD-10-CM

## 2022-01-06 DIAGNOSIS — B18.2 CHRONIC HEPATITIS C WITHOUT HEPATIC COMA: Primary | ICD-10-CM

## 2022-01-06 LAB
HCV GENTYP SERPL NAA+PROBE: NORMAL
LABORATORY COMMENT REPORT: NORMAL

## 2022-01-06 NOTE — PROGRESS NOTES
Labs show that she has hepatitis C, genotype Ia.  Persist swith some elevations in her liver enzymes.  No evidence of hepatitis B, labs show she has been vaccinated to this    CBC is normalShelly, can we please either get Epclusa or Mavyret approved for herWe will repeat her drug screen, labs placed

## 2022-01-07 ENCOUNTER — TELEPHONE (OUTPATIENT)
Dept: GASTROENTEROLOGY | Facility: CLINIC | Age: 32
End: 2022-01-07

## 2022-01-07 DIAGNOSIS — B18.2 CHRONIC HEPATITIS C WITHOUT HEPATIC COMA: Primary | ICD-10-CM

## 2022-01-11 ENCOUNTER — LAB (OUTPATIENT)
Dept: GASTROENTEROLOGY | Facility: CLINIC | Age: 32
End: 2022-01-11

## 2022-01-12 RX ORDER — IBUPROFEN 600 MG/1
600 TABLET ORAL EVERY 8 HOURS PRN
Qty: 50 TABLET | Refills: 0 | Status: SHIPPED | OUTPATIENT
Start: 2022-01-12 | End: 2022-01-31

## 2022-01-13 ENCOUNTER — TELEPHONE (OUTPATIENT)
Dept: GASTROENTEROLOGY | Facility: CLINIC | Age: 32
End: 2022-01-13

## 2022-01-13 NOTE — TELEPHONE ENCOUNTER
----- Message from Jolly Dailey MD sent at 1/6/2022  2:49 PM EST -----  Labs show that she has hepatitis C, genotype Ia.  Persist swith some elevations in her liver enzymes.  No evidence of hepatitis B, labs show she has been vaccinated to this    CBC is normalShelly, can we please either get Epclusa or Mavyret approved for herWe will repeat her drug screen, labs placed

## 2022-01-17 ENCOUNTER — TELEPHONE (OUTPATIENT)
Dept: GASTROENTEROLOGY | Facility: CLINIC | Age: 32
End: 2022-01-17

## 2022-01-17 NOTE — TELEPHONE ENCOUNTER
----- Message from Jolly Dailey MD sent at 1/14/2022  4:01 PM EST -----  Please let her know that her drug screen was entirely negative.  Office is working on getting her treatment authorized.  We will be in touch with her.

## 2022-01-18 NOTE — TELEPHONE ENCOUNTER
Epclusa paperwork faxed to [facility]. Confirmation received.   Patient called and notified.   Patient instructed to call the office once she receives the medication, so her lab appointments can be set up.   She verb understanding.

## 2022-01-31 RX ORDER — IBUPROFEN 600 MG/1
TABLET ORAL
Qty: 50 TABLET | Refills: 0 | Status: SHIPPED | OUTPATIENT
Start: 2022-01-31 | End: 2022-02-21

## 2022-02-21 RX ORDER — IBUPROFEN 600 MG/1
TABLET ORAL
Qty: 50 TABLET | Refills: 0 | Status: SHIPPED | OUTPATIENT
Start: 2022-02-21 | End: 2022-03-14

## 2022-03-14 RX ORDER — IBUPROFEN 600 MG/1
TABLET ORAL
Qty: 50 TABLET | Refills: 0 | Status: SHIPPED | OUTPATIENT
Start: 2022-03-14 | End: 2022-04-11

## 2022-04-06 RX ORDER — IBUPROFEN 600 MG/1
TABLET ORAL
Qty: 50 TABLET | Refills: 0 | OUTPATIENT
Start: 2022-04-06

## 2022-04-11 RX ORDER — IBUPROFEN 600 MG/1
TABLET ORAL
Qty: 50 TABLET | Refills: 0 | Status: SHIPPED | OUTPATIENT
Start: 2022-04-11 | End: 2022-04-11 | Stop reason: SDUPTHER

## 2022-04-11 RX ORDER — IBUPROFEN 600 MG/1
600 TABLET ORAL EVERY 8 HOURS PRN
Qty: 50 TABLET | Refills: 0 | Status: SHIPPED | OUTPATIENT
Start: 2022-04-11 | End: 2022-04-26 | Stop reason: SDUPTHER

## 2022-04-11 RX ORDER — IBUPROFEN 600 MG/1
600 TABLET ORAL EVERY 8 HOURS PRN
Qty: 50 TABLET | Refills: 0 | OUTPATIENT
Start: 2022-04-11

## 2022-04-14 ENCOUNTER — TELEPHONE (OUTPATIENT)
Dept: OBSTETRICS AND GYNECOLOGY | Facility: CLINIC | Age: 32
End: 2022-04-14

## 2022-04-14 NOTE — TELEPHONE ENCOUNTER
Good afternoon,   Pt called to be put on for ultrasound and alia per miriam. I got her scheduled I just need the ultrasound order put in.   Please advise  Thanks cleo

## 2022-04-15 DIAGNOSIS — N93.9 ABNORMAL UTERINE BLEEDING (AUB): Primary | ICD-10-CM

## 2022-04-19 ENCOUNTER — OFFICE VISIT (OUTPATIENT)
Dept: OBSTETRICS AND GYNECOLOGY | Facility: CLINIC | Age: 32
End: 2022-04-19

## 2022-04-19 VITALS
BODY MASS INDEX: 31.47 KG/M2 | HEIGHT: 62 IN | SYSTOLIC BLOOD PRESSURE: 120 MMHG | DIASTOLIC BLOOD PRESSURE: 70 MMHG | WEIGHT: 171 LBS

## 2022-04-19 DIAGNOSIS — R53.83 OTHER FATIGUE: ICD-10-CM

## 2022-04-19 DIAGNOSIS — N92.1 BREAKTHROUGH BLEEDING ON DEPO-PROVERA: Primary | ICD-10-CM

## 2022-04-19 PROCEDURE — 99213 OFFICE O/P EST LOW 20 MIN: CPT | Performed by: OBSTETRICS & GYNECOLOGY

## 2022-04-19 RX ORDER — ESTRADIOL 1 MG/1
1 TABLET ORAL DAILY
Qty: 21 TABLET | Refills: 0 | Status: SHIPPED | OUTPATIENT
Start: 2022-04-19 | End: 2022-05-10

## 2022-04-19 NOTE — PROGRESS NOTES
MIKE Ag  is a 31 y.o. female who presents to discuss abnormal bleeding.  She has been on Depo-Provera since last September.  Initially, she was amenorrheic.  Over the last month, she has bled irregularly and unpredictably.  Sometimes it is heavy and sometimes it is light.  Bleeding stopped yesterday.  It is not associated with cramps or pain.  The patient denies any significant weight change.  Has not started or stopped any medications recently.  No trauma to the area.  She does complain of fatigue as well as cold intolerance.      Ultrasound was performed today.  This was reviewed and discussed with the patient.  It shows a normal-sized uterus with a very thin endometrial stripe of 0.34 cm.  No adnexal masses are present    Chief Complaint   Patient presents with   • Follow-up     Patient is here for a f/u abnormal bleeding.       Past Medical History:   Diagnosis Date   • Bronchitis    • Infectious viral hepatitis C       Past Surgical History:   Procedure Laterality Date   • ANKLE SURGERY     •  SECTION N/A 2020    Procedure:  SECTION PRIMARY;  Surgeon: Benny Boateng MD;  Location: [facility] LABOR DELIVERY;  Service: Obstetrics/Gynecology;  Laterality: N/A;   • TONSILLECTOMY         Social History     Socioeconomic History   • Marital status: Single   Tobacco Use   • Smoking status: Current Every Day Smoker   Vaping Use   • Vaping Use: Every day   • Substances: Nicotine   • Devices: Refillable tank   Substance and Sexual Activity   • Alcohol use: Not Currently   • Drug use: Not Currently   • Sexual activity: Yes     Partners: Male       The following portions of the patient's history were reviewed and updated as appropriate: allergies, current medications, past family history, past medical history, past social history, past surgical history and problem list.    Review of Systems      This is positive for irregular bleeding.  It is positive for fatigue.  It is positive for cold  intolerance.  It is negative for abdominal or pelvic pain.  It is negative for fever or chills.  Negative for itching or sweats.  Negative for headaches.  Negative for nipple discharge.    Physical Exam  Vitals and nursing note reviewed.   Constitutional:       Appearance: Normal appearance.   Abdominal:      General: There is no distension.      Palpations: Abdomen is soft.      Tenderness: There is no abdominal tenderness.   Genitourinary:     Comments: Normal female external genitalia  The vagina is estrogenized.  There is no blood in the vault.  There are no vaginal lesions.  The cervix is normal in appearance.  It is not tender to palpation.  The uterus is mobile within the pelvis.  It is nontender.  It is normal in size.  No adnexal masses or tenderness are palpable  Neurological:      Mental Status: She is alert and oriented to person, place, and time.         Assessment    Diagnoses and all orders for this visit:    1. Breakthrough bleeding on Depo-Provera (Primary)    2. Other fatigue  -     TSH  -     CBC & Differential    Other orders  -     estradiol (Estrace) 1 MG tablet; Take 1 tablet by mouth Daily for 21 days.  Dispense: 21 tablet; Refill: 0        Plan  1. Abnormal uterine bleeding.  By history, physical examination and ultrasound findings, this is most consistent with progesterone breakthrough bleeding.  We reviewed a diagram together and discussed the pathophysiology of this condition.  I answered the patient's questions.  I recommend treatment with oral estrogen for 3 weeks to rebuild the endometrial lining.  The patient agrees with this recommendation.  A prescription has been sent.  2. Fatigue, weight gain and cold intolerance.  These can be signs of hypothyroidism.  I counseled the patient and answered her questions.  We will check a thyroid-stimulating hormone as well as a hemoglobin.    No follow-ups on file.    Social History     Tobacco Use   Smoking Status Current Every Day Smoker    Smokeless Tobacco Not on file

## 2022-04-20 LAB
BASOPHILS # BLD AUTO: 0.1 X10E3/UL (ref 0–0.2)
BASOPHILS NFR BLD AUTO: 1 %
EOSINOPHIL # BLD AUTO: 0.1 X10E3/UL (ref 0–0.4)
EOSINOPHIL NFR BLD AUTO: 2 %
ERYTHROCYTE [DISTWIDTH] IN BLOOD BY AUTOMATED COUNT: 12.7 % (ref 11.7–15.4)
HCT VFR BLD AUTO: 41.9 % (ref 34–46.6)
HGB BLD-MCNC: 13.9 G/DL (ref 11.1–15.9)
IMM GRANULOCYTES # BLD AUTO: 0 X10E3/UL (ref 0–0.1)
IMM GRANULOCYTES NFR BLD AUTO: 0 %
LYMPHOCYTES # BLD AUTO: 1.5 X10E3/UL (ref 0.7–3.1)
LYMPHOCYTES NFR BLD AUTO: 22 %
MCH RBC QN AUTO: 28.8 PG (ref 26.6–33)
MCHC RBC AUTO-ENTMCNC: 33.2 G/DL (ref 31.5–35.7)
MCV RBC AUTO: 87 FL (ref 79–97)
MONOCYTES # BLD AUTO: 0.4 X10E3/UL (ref 0.1–0.9)
MONOCYTES NFR BLD AUTO: 6 %
NEUTROPHILS # BLD AUTO: 4.7 X10E3/UL (ref 1.4–7)
NEUTROPHILS NFR BLD AUTO: 69 %
PLATELET # BLD AUTO: 324 X10E3/UL (ref 150–450)
RBC # BLD AUTO: 4.83 X10E6/UL (ref 3.77–5.28)
TSH SERPL DL<=0.005 MIU/L-ACNC: 2.03 UIU/ML (ref 0.45–4.5)
WBC # BLD AUTO: 6.8 X10E3/UL (ref 3.4–10.8)

## 2022-04-25 RX ORDER — IBUPROFEN 600 MG/1
TABLET ORAL
Qty: 50 TABLET | Refills: 0 | OUTPATIENT
Start: 2022-04-25

## 2022-04-26 RX ORDER — IBUPROFEN 600 MG/1
600 TABLET ORAL EVERY 8 HOURS PRN
Qty: 50 TABLET | Refills: 0 | Status: SHIPPED | OUTPATIENT
Start: 2022-04-26 | End: 2022-05-16 | Stop reason: SDUPTHER

## 2022-05-02 RX ORDER — MEDROXYPROGESTERONE ACETATE 150 MG/ML
150 INJECTION, SUSPENSION INTRAMUSCULAR
Qty: 1 EACH | Refills: 3 | Status: SHIPPED | OUTPATIENT
Start: 2022-05-02

## 2022-05-10 RX ORDER — IBUPROFEN 600 MG/1
TABLET ORAL
Qty: 50 TABLET | Refills: 0 | OUTPATIENT
Start: 2022-05-10

## 2022-05-10 RX ORDER — IBUPROFEN 600 MG/1
600 TABLET ORAL EVERY 8 HOURS PRN
Qty: 50 TABLET | Refills: 0 | OUTPATIENT
Start: 2022-05-10

## 2022-05-16 RX ORDER — IBUPROFEN 600 MG/1
600 TABLET ORAL EVERY 8 HOURS PRN
Qty: 50 TABLET | Refills: 0 | Status: SHIPPED | OUTPATIENT
Start: 2022-05-16 | End: 2022-06-05 | Stop reason: SDUPTHER

## 2022-06-06 RX ORDER — IBUPROFEN 600 MG/1
600 TABLET ORAL EVERY 8 HOURS PRN
Qty: 50 TABLET | Refills: 0 | Status: SHIPPED | OUTPATIENT
Start: 2022-06-06 | End: 2022-06-30 | Stop reason: SDUPTHER

## 2022-06-30 RX ORDER — IBUPROFEN 600 MG/1
600 TABLET ORAL EVERY 8 HOURS PRN
Qty: 50 TABLET | Refills: 0 | Status: SHIPPED | OUTPATIENT
Start: 2022-06-30 | End: 2022-07-22 | Stop reason: SDUPTHER

## 2022-07-22 RX ORDER — IBUPROFEN 600 MG/1
600 TABLET ORAL EVERY 8 HOURS PRN
Qty: 50 TABLET | Refills: 0 | Status: SHIPPED | OUTPATIENT
Start: 2022-07-22 | End: 2022-08-15 | Stop reason: SDUPTHER

## 2022-08-02 ENCOUNTER — OFFICE VISIT (OUTPATIENT)
Dept: GASTROENTEROLOGY | Facility: CLINIC | Age: 32
End: 2022-08-02

## 2022-08-02 VITALS
WEIGHT: 172.4 LBS | DIASTOLIC BLOOD PRESSURE: 78 MMHG | HEIGHT: 62 IN | HEART RATE: 90 BPM | TEMPERATURE: 96.5 F | SYSTOLIC BLOOD PRESSURE: 110 MMHG | BODY MASS INDEX: 31.73 KG/M2

## 2022-08-02 DIAGNOSIS — B18.2 CHRONIC HEPATITIS C WITHOUT HEPATIC COMA: Primary | ICD-10-CM

## 2022-08-02 PROCEDURE — 99213 OFFICE O/P EST LOW 20 MIN: CPT | Performed by: PHYSICIAN ASSISTANT

## 2022-08-02 NOTE — PROGRESS NOTES
"Chief Complaint  Hepatitis C    Subjective          History of Present Illness    Demi Ag is a  32 y.o. female presents for follow-up on hep C, genotype Ia.  She is a patient of Dr. Dailey last seen on 1/3/2022.  She is new to me.    Patient took 12 weeks of Epclusa.  She was treatment naive. She tolerated it well. She did have some fogginess during treatment this is resolved.  She is doing well today without complaints.  She denies nausea, vomiting, abdominal pain, melena, medic easier, constipation, diarrhea.    Labs confirm vaccination to hepatitis B.  Remains sober, no drug abuse for many years.    Objective   Vital Signs:   /78   Pulse 90   Temp 96.5 °F (35.8 °C)   Ht 157.5 cm (62\")   Wt 78.2 kg (172 lb 6.4 oz)   BMI 31.53 kg/m²       Physical Exam  Vitals reviewed.   Constitutional:       General: She is awake. She is not in acute distress.     Appearance: Normal appearance. She is well-developed and well-groomed.   HENT:      Head: Normocephalic.   Pulmonary:      Effort: Pulmonary effort is normal. No respiratory distress.   Abdominal:      General: Abdomen is flat. There is no distension.      Palpations: Abdomen is soft. There is no hepatomegaly or splenomegaly.      Tenderness: There is no abdominal tenderness.   Skin:     Coloration: Skin is not pale.   Neurological:      Mental Status: She is alert and oriented to person, place, and time.      Gait: Gait is intact.   Psychiatric:         Mood and Affect: Mood and affect normal.         Speech: Speech normal.         Behavior: Behavior is cooperative.         Judgment: Judgment normal.          Result Review :             Assessment and Plan    Diagnoses and all orders for this visit:    1. Chronic hepatitis C without hepatic coma (HCC) (Primary)  -     Comprehensive Metabolic Panel  -     CBC & Differential  -     Hepatitis C RNA, Quantitative, PCR (graph)    We will recheck labs today including hep C viral load.  If negative, she will " follow-up in 6 months with 1 more check of viral load at that time.    Follow Up   Return in about 6 months (around 2/2/2023) for [place] or Dr. Dailey.    Dragon dictation used throughout this note.     Trinidad Hoffman PA-C

## 2022-08-03 LAB
ALBUMIN SERPL-MCNC: 4.6 G/DL (ref 3.8–4.8)
ALBUMIN/GLOB SERPL: 1.7 {RATIO} (ref 1.2–2.2)
ALP SERPL-CCNC: 68 IU/L (ref 44–121)
ALT SERPL-CCNC: 16 IU/L (ref 0–32)
AST SERPL-CCNC: 13 IU/L (ref 0–40)
BASOPHILS # BLD AUTO: 0 X10E3/UL (ref 0–0.2)
BASOPHILS NFR BLD AUTO: 1 %
BILIRUB SERPL-MCNC: 0.5 MG/DL (ref 0–1.2)
BUN SERPL-MCNC: 9 MG/DL (ref 6–20)
BUN/CREAT SERPL: 11 (ref 9–23)
CALCIUM SERPL-MCNC: 9.1 MG/DL (ref 8.7–10.2)
CHLORIDE SERPL-SCNC: 104 MMOL/L (ref 96–106)
CO2 SERPL-SCNC: 28 MMOL/L (ref 20–29)
CREAT SERPL-MCNC: 0.84 MG/DL (ref 0.57–1)
EGFRCR SERPLBLD CKD-EPI 2021: 93 ML/MIN/1.73
EOSINOPHIL # BLD AUTO: 0.1 X10E3/UL (ref 0–0.4)
EOSINOPHIL NFR BLD AUTO: 1 %
ERYTHROCYTE [DISTWIDTH] IN BLOOD BY AUTOMATED COUNT: 13 % (ref 11.7–15.4)
GLOBULIN SER CALC-MCNC: 2.7 G/DL (ref 1.5–4.5)
GLUCOSE SERPL-MCNC: 92 MG/DL (ref 65–99)
HCT VFR BLD AUTO: 39 % (ref 34–46.6)
HGB BLD-MCNC: 13 G/DL (ref 11.1–15.9)
IMM GRANULOCYTES # BLD AUTO: 0 X10E3/UL (ref 0–0.1)
IMM GRANULOCYTES NFR BLD AUTO: 0 %
LYMPHOCYTES # BLD AUTO: 1.5 X10E3/UL (ref 0.7–3.1)
LYMPHOCYTES NFR BLD AUTO: 24 %
MCH RBC QN AUTO: 28.4 PG (ref 26.6–33)
MCHC RBC AUTO-ENTMCNC: 33.3 G/DL (ref 31.5–35.7)
MCV RBC AUTO: 85 FL (ref 79–97)
MONOCYTES # BLD AUTO: 0.5 X10E3/UL (ref 0.1–0.9)
MONOCYTES NFR BLD AUTO: 8 %
NEUTROPHILS # BLD AUTO: 4.2 X10E3/UL (ref 1.4–7)
NEUTROPHILS NFR BLD AUTO: 66 %
PLATELET # BLD AUTO: 274 X10E3/UL (ref 150–450)
POTASSIUM SERPL-SCNC: 4 MMOL/L (ref 3.5–5.2)
PROT SERPL-MCNC: 7.3 G/DL (ref 6–8.5)
RBC # BLD AUTO: 4.57 X10E6/UL (ref 3.77–5.28)
SODIUM SERPL-SCNC: 143 MMOL/L (ref 134–144)
WBC # BLD AUTO: 6.3 X10E3/UL (ref 3.4–10.8)

## 2022-08-04 LAB
HCV RNA SERPL NAA+PROBE-ACNC: NORMAL IU/ML
TEST INFORMATION: NORMAL

## 2022-08-15 RX ORDER — IBUPROFEN 600 MG/1
600 TABLET ORAL EVERY 8 HOURS PRN
Qty: 50 TABLET | Refills: 0 | Status: SHIPPED | OUTPATIENT
Start: 2022-08-15 | End: 2022-09-13 | Stop reason: SDUPTHER

## 2022-09-14 RX ORDER — IBUPROFEN 600 MG/1
600 TABLET ORAL EVERY 8 HOURS PRN
Qty: 50 TABLET | Refills: 0 | Status: SHIPPED | OUTPATIENT
Start: 2022-09-14 | End: 2022-11-05 | Stop reason: SDUPTHER

## 2022-11-07 RX ORDER — IBUPROFEN 600 MG/1
600 TABLET ORAL EVERY 8 HOURS PRN
Qty: 50 TABLET | Refills: 0 | Status: SHIPPED | OUTPATIENT
Start: 2022-11-07

## 2023-05-23 NOTE — TELEPHONE ENCOUNTER
----- Message from Jolly Dailey MD sent at 1/6/2022  2:49 PM EST -----  Labs show that she has hepatitis C, genotype Ia.  Persist swith some elevations in her liver enzymes.  No evidence of hepatitis B, labs show she has been vaccinated to this    CBC is normalShelly, can we please either get Epclusa or Mavyret approved for herWe will repeat her drug screen, labs placed  
Call to patient. Advised she needs to have more blood work drawn before her Hep C med can be prescribed. She verb understanding.   Lab scheduled for 01/11@1500.  
Call to pt.  Advise per Dr Dailey note.  Verb understanding.     Message to Caroline BINGHAM   
Order signed, thx  
Never

## 2023-08-28 RX ORDER — IBUPROFEN 600 MG/1
600 TABLET ORAL EVERY 8 HOURS PRN
Qty: 50 TABLET | Refills: 0 | OUTPATIENT
Start: 2023-08-28

## 2023-08-31 NOTE — TELEPHONE ENCOUNTER
Med refill. States her MD is Dr Kilo NICHOLSON scheduled 10/5/23. [facility] pharmacy. Thank you

## 2023-10-05 ENCOUNTER — OFFICE VISIT (OUTPATIENT)
Dept: OBSTETRICS AND GYNECOLOGY | Facility: CLINIC | Age: 33
End: 2023-10-05
Payer: COMMERCIAL

## 2023-10-05 VITALS
HEIGHT: 63 IN | WEIGHT: 146 LBS | SYSTOLIC BLOOD PRESSURE: 120 MMHG | BODY MASS INDEX: 25.87 KG/M2 | DIASTOLIC BLOOD PRESSURE: 68 MMHG

## 2023-10-05 DIAGNOSIS — Z00.00 ANNUAL PHYSICAL EXAM: Primary | ICD-10-CM

## 2023-10-05 RX ORDER — MEDROXYPROGESTERONE ACETATE 150 MG/ML
150 INJECTION, SUSPENSION INTRAMUSCULAR
Qty: 1 EACH | Refills: 3 | Status: SHIPPED | OUTPATIENT
Start: 2023-10-05

## 2023-10-05 NOTE — PROGRESS NOTES
MIKE Ag  is a 33 y.o. female who presents for a routine GYN Hologic exam.  Overall, she is feeling well.  Bowels and bladder are functioning normally.  The patient has been using Depo-Provera for contraception.  She missed her last dose, but would like to start again.    Chief Complaint   Patient presents with    Gynecologic Exam     Patient is here for a annual.       Past Medical History:   Diagnosis Date    Bronchitis     Infectious viral hepatitis C       Past Surgical History:   Procedure Laterality Date    ANKLE SURGERY       SECTION N/A 2020    Procedure:  SECTION PRIMARY;  Surgeon: Benny Boateng MD;  Location: [facility] LABOR DELIVERY;  Service: Obstetrics/Gynecology;  Laterality: N/A;    TONSILLECTOMY         Social History     Socioeconomic History    Marital status: Single   Tobacco Use    Smoking status: Every Day    Smokeless tobacco: Never   Vaping Use    Vaping Use: Every day    Substances: Nicotine    Devices: Refillable tank   Substance and Sexual Activity    Alcohol use: Not Currently    Drug use: Not Currently    Sexual activity: Yes     Partners: Male       The following portions of the patient's history were reviewed and updated as appropriate: allergies, current medications, past family history, past medical history, past social history, past surgical history and problem list.    Review of Systems   Constitutional: Negative.    HENT: Negative.     Respiratory: Negative.     Cardiovascular: Negative.    Gastrointestinal: Negative.    Genitourinary: Negative.    Musculoskeletal: Negative.    Skin: Negative.    Allergic/Immunologic: Negative.    Psychiatric/Behavioral: Negative.             Physical Exam  Vitals and nursing note reviewed.   Constitutional:       Appearance: She is well-developed.   HENT:      Head: Normocephalic and atraumatic.   Cardiovascular:      Rate and Rhythm: Normal rate and regular rhythm.   Pulmonary:      Effort: Pulmonary effort is  normal.      Breath sounds: Normal breath sounds. No wheezing or rales.   Chest:      Comments: The breasts are homogeneous.  There are no palpable lumps.  Nipple discharge and axillary adenopathy are absent.  Abdominal:      General: There is no distension.      Palpations: Abdomen is soft.      Tenderness: There is no abdominal tenderness.   Genitourinary:     Labia:         Right: No lesion.         Left: No lesion.       Vagina: Normal. No vaginal discharge.      Cervix: No cervical motion tenderness.      Uterus: Normal. Not enlarged and not tender.       Adnexa:         Right: No mass or tenderness.          Left: No mass or tenderness.     Skin:     General: Skin is warm and dry.   Neurological:      Mental Status: She is alert and oriented to person, place, and time.       Assessment    Diagnoses and all orders for this visit:    1. Annual physical exam (Primary)    Other orders  -     medroxyPROGESTERone (Depo-Provera) 150 MG/ML injection; Inject 1 mL into the appropriate muscle as directed by prescriber Every 3 (Three) Months.  Dispense: 1 each; Refill: 3        Plan  Annual examination performed  Contraceptive counseling.  The patient was very satisfied with Depo-Provera and would like to resume it.  She missed her last dose.  She has been abstinent from sex for the last 2 weeks.  She will schedule an injection for tomorrow.  Pregnancy test prior to placement.    No follow-ups on file.    Social History     Tobacco Use   Smoking Status Every Day   Smokeless Tobacco Never

## 2023-10-11 LAB
CONV .: ABNORMAL
CYTOLOGIST CVX/VAG CYTO: ABNORMAL
CYTOLOGY CVX/VAG DOC CYTO: ABNORMAL
CYTOLOGY CVX/VAG DOC THIN PREP: ABNORMAL
DX ICD CODE: ABNORMAL
DX ICD CODE: ABNORMAL
HIV 1 & 2 AB SER-IMP: ABNORMAL
HPV I/H RISK 4 DNA CVX QL PROBE+SIG AMP: POSITIVE
OTHER STN SPEC: ABNORMAL
PATHOLOGIST CVX/VAG CYTO: ABNORMAL
RECOM F/U CVX/VAG CYTO: ABNORMAL
STAT OF ADQ CVX/VAG CYTO-IMP: ABNORMAL

## 2023-10-12 ENCOUNTER — TELEPHONE (OUTPATIENT)
Dept: OBSTETRICS AND GYNECOLOGY | Facility: CLINIC | Age: 33
End: 2023-10-12
Payer: COMMERCIAL

## 2023-10-12 NOTE — TELEPHONE ENCOUNTER
----- Message from Denae Mckinley MD sent at 10/12/2023 12:55 PM EDT -----  Please call and let her know that her pap smear did return slightly abnormal with atypical cells of undetermined significance and positive for HPV. I recommend she be evaluated with a colposcopy to rule out cervical dysplasia with Dr. Boateng. Thanks!

## 2023-10-30 ENCOUNTER — PROCEDURE VISIT (OUTPATIENT)
Dept: OBSTETRICS AND GYNECOLOGY | Facility: CLINIC | Age: 33
End: 2023-10-30
Payer: COMMERCIAL

## 2023-10-30 VITALS
DIASTOLIC BLOOD PRESSURE: 64 MMHG | WEIGHT: 144 LBS | BODY MASS INDEX: 25.52 KG/M2 | SYSTOLIC BLOOD PRESSURE: 112 MMHG | HEIGHT: 63 IN

## 2023-10-30 DIAGNOSIS — R87.810 ASCUS WITH POSITIVE HIGH RISK HPV CERVICAL: Primary | ICD-10-CM

## 2023-10-30 DIAGNOSIS — R87.610 ASCUS WITH POSITIVE HIGH RISK HPV CERVICAL: Primary | ICD-10-CM

## 2023-10-30 LAB
B-HCG UR QL: NEGATIVE
EXPIRATION DATE: NORMAL
INTERNAL NEGATIVE CONTROL: NORMAL
INTERNAL POSITIVE CONTROL: NORMAL
Lab: NORMAL

## 2023-10-30 RX ORDER — MEDROXYPROGESTERONE ACETATE 150 MG/ML
150 INJECTION, SUSPENSION INTRAMUSCULAR ONCE
Status: COMPLETED | OUTPATIENT
Start: 2023-10-30 | End: 2023-10-30

## 2023-10-30 RX ADMIN — MEDROXYPROGESTERONE ACETATE 150 MG: 150 INJECTION, SUSPENSION INTRAMUSCULAR at 12:17

## 2023-10-30 NOTE — PROGRESS NOTES
MIKE Ag  is a 33 y.o. female who presents for colposcopy due to a Pap showing ASCUS with positive HPV.  I counseled the patient regarding the pathophysiology of this condition and answered her questions.  We also discussed my recommendation for colposcopy.  I answered the patient's questions and she agreed to proceed.    Chief Complaint   Patient presents with    Contraception     Patient is here for patient supplied depo shot and colpo procedure.       Past Medical History:   Diagnosis Date    Bronchitis     Infectious viral hepatitis C       Past Surgical History:   Procedure Laterality Date    ANKLE SURGERY       SECTION N/A 2020    Procedure:  SECTION PRIMARY;  Surgeon: Benny Boateng MD;  Location: [facility] LABOR DELIVERY;  Service: Obstetrics/Gynecology;  Laterality: N/A;    TONSILLECTOMY         Social History     Socioeconomic History    Marital status: Single   Tobacco Use    Smoking status: Every Day    Smokeless tobacco: Never   Vaping Use    Vaping Use: Every day    Substances: Nicotine    Devices: Refillable tank   Substance and Sexual Activity    Alcohol use: Not Currently    Drug use: Not Currently    Sexual activity: Yes     Partners: Male       The following portions of the patient's history were reviewed and updated as appropriate: allergies, current medications, past family history, past medical history, past social history, past surgical history and problem list.    Review of Systems          Physical Exam  Vitals and nursing note reviewed.   Constitutional:       Appearance: Normal appearance.   Genitourinary:     Comments: Speculum was placed for exposure.  Colposcopy was then performed with good visualization of the transformation zone.  There was mild acetowhite epithelium at the 10 o'clock position.  Biopsy performed.  This was treated with Monsel solution to hemostasis.  The patient tolerated the procedure well.  Neurological:      Mental Status: She is  alert and oriented to person, place, and time.         Assessment    Diagnoses and all orders for this visit:    1. ASCUS with positive high risk HPV cervical (Primary)  -     POC Pregnancy, Urine  -     medroxyPROGESTERone (DEPO-PROVERA) injection 150 mg        Plan  Colposcopy with biopsy performed as described above.  The patient tolerated the procedure well.    No follow-ups on file.    Social History     Tobacco Use   Smoking Status Every Day   Smokeless Tobacco Never

## 2023-11-06 ENCOUNTER — TELEPHONE (OUTPATIENT)
Dept: OBSTETRICS AND GYNECOLOGY | Facility: CLINIC | Age: 33
End: 2023-11-06
Payer: COMMERCIAL

## 2023-11-06 NOTE — TELEPHONE ENCOUNTER
----- Message from Benny Boateng MD sent at 11/3/2023  1:14 PM EDT -----  Please contact the patient and let her know that her cervical biopsy was benign.  Please help her to schedule a repeat Pap for 6 months.  Thank you

## 2024-02-13 ENCOUNTER — TELEPHONE (OUTPATIENT)
Dept: OBSTETRICS AND GYNECOLOGY | Facility: CLINIC | Age: 34
End: 2024-02-13
Payer: COMMERCIAL

## 2024-02-26 RX ORDER — IBUPROFEN 600 MG/1
600 TABLET ORAL EVERY 8 HOURS PRN
Qty: 50 TABLET | Refills: 0 | OUTPATIENT
Start: 2024-02-26

## 2024-03-15 ENCOUNTER — TELEPHONE (OUTPATIENT)
Dept: OBSTETRICS AND GYNECOLOGY | Facility: CLINIC | Age: 34
End: 2024-03-15

## 2024-03-15 NOTE — TELEPHONE ENCOUNTER
Provider: DR SOMMERS    Caller: NEFTALI GORMAN    Reason for Call: SAME DAY CANCELLATION FOR GYN FOLLOW UP @ 10:40am - CAR TROUBLE - HUB R/S FOR 03/22/24 @ 10:30am

## 2024-03-28 ENCOUNTER — TELEPHONE (OUTPATIENT)
Dept: OBSTETRICS AND GYNECOLOGY | Facility: CLINIC | Age: 34
End: 2024-03-28
Payer: COMMERCIAL

## 2024-09-11 NOTE — CONSULTS
Consult to Infection Control nurse received pertaining to patient's history of chronic Hepatitis C.  State reportable EPID [id number] form Hepatitis Infection in a Pregnant Woman will be sent to the [facility] Department. Jessy Singh RN Infection Prevention and Control      Detail Level: Detailed

## (undated) DEVICE — ANTIBACTERIAL UNDYED BRAIDED (POLYGLACTIN 910), SYNTHETIC ABSORBABLE SUTURE: Brand: COATED VICRYL

## (undated) DEVICE — STRIP SKIN CLOSEURE PROXI 1X5IN

## (undated) DEVICE — GLV SURG BIOGEL LTX PF 7 1/2

## (undated) DEVICE — KENDALL SCD EXPRESS SLEEVES, KNEE LENGTH, MEDIUM: Brand: KENDALL SCD

## (undated) DEVICE — SUT PLAIN  3/0 CT1 27IN 842H

## (undated) DEVICE — SOL IRR H2O BTL 1000ML STRL

## (undated) DEVICE — 3M(TM) TEGADERM(TM) TRANSPARENT FILM DRESSING FRAME STYLE 1627: Brand: 3M™ TEGADERM™